# Patient Record
Sex: FEMALE | Race: WHITE | NOT HISPANIC OR LATINO | Employment: FULL TIME | URBAN - METROPOLITAN AREA
[De-identification: names, ages, dates, MRNs, and addresses within clinical notes are randomized per-mention and may not be internally consistent; named-entity substitution may affect disease eponyms.]

---

## 2018-02-09 ENCOUNTER — CONVERSION ENCOUNTER (OUTPATIENT)
Dept: GENERAL RADIOLOGY | Facility: HOSPITAL | Age: 38
End: 2018-02-09

## 2019-02-21 ENCOUNTER — HOSPITAL ENCOUNTER (OUTPATIENT)
Dept: OTHER | Facility: HOSPITAL | Age: 39
Discharge: HOME | End: 2019-02-21

## 2019-02-21 LAB
ALBUMIN SERPL-MCNC: 4.4 G/DL (ref 3.5–5)
ALBUMIN/GLOB SERPL: 1.6 {RATIO} (ref 1.4–2.6)
ALP SERPL-CCNC: 50 U/L (ref 42–98)
ALT SERPL-CCNC: 13 U/L (ref 10–40)
ANION GAP SERPL CALC-SCNC: 19 MMOL/L (ref 8–19)
AST SERPL-CCNC: 19 U/L (ref 15–50)
BASOPHILS # BLD AUTO: 0.06 10*3/UL (ref 0–0.2)
BASOPHILS NFR BLD AUTO: 0.7 % (ref 0–3)
BILIRUB SERPL-MCNC: 0.38 MG/DL (ref 0.2–1.3)
BUN SERPL-MCNC: 11 MG/DL (ref 5–25)
BUN/CREAT SERPL: 15 {RATIO} (ref 6–20)
CALCIUM SERPL-MCNC: 9.4 MG/DL (ref 8.7–10.4)
CHLORIDE SERPL-SCNC: 102 MMOL/L (ref 99–111)
CHOLEST SERPL-MCNC: 200 MG/DL (ref 107–200)
CHOLEST/HDLC SERPL: 2.2 {RATIO} (ref 3–6)
CONV ABS IMM GRAN: 0.02 10*3/UL (ref 0–0.2)
CONV CO2: 25 MMOL/L (ref 22–32)
CONV IMMATURE GRAN: 0.2 % (ref 0–1.8)
CONV TOTAL PROTEIN: 7.2 G/DL (ref 6.3–8.2)
CREAT UR-MCNC: 0.75 MG/DL (ref 0.5–0.9)
DEPRECATED RDW RBC AUTO: 41.4 FL (ref 36.4–46.3)
EOSINOPHIL # BLD AUTO: 0.11 10*3/UL (ref 0–0.7)
EOSINOPHIL # BLD AUTO: 1.2 % (ref 0–7)
ERYTHROCYTE [DISTWIDTH] IN BLOOD BY AUTOMATED COUNT: 12.6 % (ref 11.7–14.4)
GFR SERPLBLD BASED ON 1.73 SQ M-ARVRAT: >60 ML/MIN/{1.73_M2}
GLOBULIN UR ELPH-MCNC: 2.8 G/DL (ref 2–3.5)
GLUCOSE SERPL-MCNC: 93 MG/DL (ref 65–99)
HBA1C MFR BLD: 14.5 G/DL (ref 12–16)
HCT VFR BLD AUTO: 44.2 % (ref 37–47)
HDLC SERPL-MCNC: 91 MG/DL (ref 40–60)
LDLC SERPL CALC-MCNC: 97 MG/DL (ref 70–100)
LYMPHOCYTES # BLD AUTO: 1.82 10*3/UL (ref 1–5)
MCH RBC QN AUTO: 29.4 PG (ref 27–31)
MCHC RBC AUTO-ENTMCNC: 32.8 G/DL (ref 33–37)
MCV RBC AUTO: 89.7 FL (ref 81–99)
MONOCYTES # BLD AUTO: 0.57 10*3/UL (ref 0.2–1.2)
MONOCYTES NFR BLD AUTO: 6.4 % (ref 3–10)
NEUTROPHILS # BLD AUTO: 6.37 10*3/UL (ref 2–8)
NEUTROPHILS NFR BLD AUTO: 71.2 % (ref 30–85)
NRBC CBCN: 0 % (ref 0–0.7)
OSMOLALITY SERPL CALC.SUM OF ELEC: 293 MOSM/KG (ref 273–304)
PLATELET # BLD AUTO: 245 10*3/UL (ref 130–400)
PMV BLD AUTO: 12.2 FL (ref 9.4–12.3)
POTASSIUM SERPL-SCNC: 4.1 MMOL/L (ref 3.5–5.3)
RBC # BLD AUTO: 4.93 10*6/UL (ref 4.2–5.4)
SODIUM SERPL-SCNC: 142 MMOL/L (ref 135–147)
TRIGL SERPL-MCNC: 58 MG/DL (ref 40–150)
TSH SERPL-ACNC: 1.81 M[IU]/L (ref 0.27–4.2)
VARIANT LYMPHS NFR BLD MANUAL: 20.3 % (ref 20–45)
VLDLC SERPL-MCNC: 12 MG/DL (ref 5–37)
WBC # BLD AUTO: 8.95 10*3/UL (ref 4.8–10.8)

## 2019-03-18 ENCOUNTER — HOSPITAL ENCOUNTER (OUTPATIENT)
Dept: OTHER | Facility: HOSPITAL | Age: 39
Discharge: HOME | End: 2019-03-18
Attending: OBSTETRICS & GYNECOLOGY

## 2020-02-07 ENCOUNTER — HOSPITAL ENCOUNTER (OUTPATIENT)
Dept: OTHER | Facility: HOSPITAL | Age: 40
Discharge: HOME | End: 2020-02-07

## 2020-02-07 LAB
ALBUMIN SERPL-MCNC: 4.4 G/DL (ref 3.5–5)
ALBUMIN/GLOB SERPL: 1.8 {RATIO} (ref 1.4–2.6)
ALP SERPL-CCNC: 53 U/L (ref 42–98)
ALT SERPL-CCNC: 9 U/L (ref 10–40)
ANION GAP SERPL CALC-SCNC: 18 MMOL/L (ref 8–19)
AST SERPL-CCNC: 16 U/L (ref 15–50)
BASOPHILS # BLD AUTO: 0.06 10*3/UL (ref 0–0.2)
BASOPHILS NFR BLD AUTO: 1.1 % (ref 0–3)
BILIRUB SERPL-MCNC: 0.31 MG/DL (ref 0.2–1.3)
BUN SERPL-MCNC: 10 MG/DL (ref 5–25)
BUN/CREAT SERPL: 13 {RATIO} (ref 6–20)
CALCIUM SERPL-MCNC: 9.4 MG/DL (ref 8.7–10.4)
CHLORIDE SERPL-SCNC: 101 MMOL/L (ref 99–111)
CHOLEST SERPL-MCNC: 163 MG/DL (ref 107–200)
CHOLEST/HDLC SERPL: 2.5 {RATIO} (ref 3–6)
CONV ABS IMM GRAN: 0.01 10*3/UL (ref 0–0.2)
CONV CO2: 25 MMOL/L (ref 22–32)
CONV IMMATURE GRAN: 0.2 % (ref 0–1.8)
CONV TOTAL PROTEIN: 6.9 G/DL (ref 6.3–8.2)
CREAT UR-MCNC: 0.75 MG/DL (ref 0.5–0.9)
DEPRECATED RDW RBC AUTO: 39.8 FL (ref 36.4–46.3)
EOSINOPHIL # BLD AUTO: 0.13 10*3/UL (ref 0–0.7)
EOSINOPHIL # BLD AUTO: 2.4 % (ref 0–7)
ERYTHROCYTE [DISTWIDTH] IN BLOOD BY AUTOMATED COUNT: 12.3 % (ref 11.7–14.4)
GFR SERPLBLD BASED ON 1.73 SQ M-ARVRAT: >60 ML/MIN/{1.73_M2}
GLOBULIN UR ELPH-MCNC: 2.5 G/DL (ref 2–3.5)
GLUCOSE SERPL-MCNC: 100 MG/DL (ref 65–99)
HCT VFR BLD AUTO: 42.7 % (ref 37–47)
HDLC SERPL-MCNC: 66 MG/DL (ref 40–60)
HGB BLD-MCNC: 14.2 G/DL (ref 12–16)
LDLC SERPL CALC-MCNC: 86 MG/DL (ref 70–100)
LYMPHOCYTES # BLD AUTO: 1.57 10*3/UL (ref 1–5)
LYMPHOCYTES NFR BLD AUTO: 28.4 % (ref 20–45)
MCH RBC QN AUTO: 29.2 PG (ref 27–31)
MCHC RBC AUTO-ENTMCNC: 33.3 G/DL (ref 33–37)
MCV RBC AUTO: 87.9 FL (ref 81–99)
MONOCYTES # BLD AUTO: 0.44 10*3/UL (ref 0.2–1.2)
MONOCYTES NFR BLD AUTO: 8 % (ref 3–10)
NEUTROPHILS # BLD AUTO: 3.32 10*3/UL (ref 2–8)
NEUTROPHILS NFR BLD AUTO: 59.9 % (ref 30–85)
NRBC CBCN: 0 % (ref 0–0.7)
OSMOLALITY SERPL CALC.SUM OF ELEC: 289 MOSM/KG (ref 273–304)
PLATELET # BLD AUTO: 268 10*3/UL (ref 130–400)
PMV BLD AUTO: 12.3 FL (ref 9.4–12.3)
POTASSIUM SERPL-SCNC: 3.9 MMOL/L (ref 3.5–5.3)
RBC # BLD AUTO: 4.86 10*6/UL (ref 4.2–5.4)
SODIUM SERPL-SCNC: 140 MMOL/L (ref 135–147)
TRIGL SERPL-MCNC: 53 MG/DL (ref 40–150)
TSH SERPL-ACNC: 1.3 M[IU]/L (ref 0.27–4.2)
VLDLC SERPL-MCNC: 11 MG/DL (ref 5–37)
WBC # BLD AUTO: 5.53 10*3/UL (ref 4.8–10.8)

## 2020-02-10 LAB
EST. AVERAGE GLUCOSE BLD GHB EST-MCNC: 100 MG/DL
HBA1C MFR BLD: 5.1 % (ref 3.5–5.7)

## 2020-06-16 ENCOUNTER — HOSPITAL ENCOUNTER (OUTPATIENT)
Dept: OTHER | Facility: HOSPITAL | Age: 40
Discharge: HOME | End: 2020-06-16
Attending: OBSTETRICS & GYNECOLOGY

## 2020-12-28 ENCOUNTER — HOSPITAL ENCOUNTER (OUTPATIENT)
Dept: OTHER | Facility: HOSPITAL | Age: 40
Discharge: HOME | End: 2020-12-28
Attending: INTERNAL MEDICINE

## 2021-01-21 ENCOUNTER — HOSPITAL ENCOUNTER (OUTPATIENT)
Dept: OTHER | Facility: HOSPITAL | Age: 41
Discharge: HOME | End: 2021-01-21
Attending: INTERNAL MEDICINE

## 2021-03-15 ENCOUNTER — HOSPITAL ENCOUNTER (OUTPATIENT)
Dept: OTHER | Facility: HOSPITAL | Age: 41
Discharge: HOME | End: 2021-03-15

## 2021-03-15 LAB
ALBUMIN SERPL-MCNC: 4.3 G/DL (ref 3.5–5)
ALBUMIN/GLOB SERPL: 1.8 {RATIO} (ref 1.4–2.6)
ALP SERPL-CCNC: 45 U/L (ref 42–98)
ALT SERPL-CCNC: 10 U/L (ref 10–40)
ANION GAP SERPL CALC-SCNC: 16 MMOL/L (ref 8–19)
AST SERPL-CCNC: 18 U/L (ref 15–50)
BASOPHILS # BLD AUTO: 0.06 10*3/UL (ref 0–0.2)
BASOPHILS NFR BLD AUTO: 1.1 % (ref 0–3)
BILIRUB SERPL-MCNC: 0.32 MG/DL (ref 0.2–1.3)
BUN SERPL-MCNC: 10 MG/DL (ref 5–25)
BUN/CREAT SERPL: 13 {RATIO} (ref 6–20)
CALCIUM SERPL-MCNC: 8.8 MG/DL (ref 8.7–10.4)
CHLORIDE SERPL-SCNC: 101 MMOL/L (ref 99–111)
CHOLEST SERPL-MCNC: 158 MG/DL (ref 107–200)
CHOLEST/HDLC SERPL: 2.1 {RATIO} (ref 3–6)
CONV ABS IMM GRAN: 0.01 10*3/UL (ref 0–0.2)
CONV CO2: 25 MMOL/L (ref 22–32)
CONV IMMATURE GRAN: 0.2 % (ref 0–1.8)
CONV TOTAL PROTEIN: 6.7 G/DL (ref 6.3–8.2)
CREAT UR-MCNC: 0.75 MG/DL (ref 0.5–0.9)
DEPRECATED RDW RBC AUTO: 41.5 FL (ref 36.4–46.3)
EOSINOPHIL # BLD AUTO: 0.14 10*3/UL (ref 0–0.7)
EOSINOPHIL # BLD AUTO: 2.6 % (ref 0–7)
ERYTHROCYTE [DISTWIDTH] IN BLOOD BY AUTOMATED COUNT: 13.4 % (ref 11.7–14.4)
GFR SERPLBLD BASED ON 1.73 SQ M-ARVRAT: >60 ML/MIN/{1.73_M2}
GLOBULIN UR ELPH-MCNC: 2.4 G/DL (ref 2–3.5)
GLUCOSE SERPL-MCNC: 80 MG/DL (ref 65–99)
HCT VFR BLD AUTO: 40.9 % (ref 37–47)
HDLC SERPL-MCNC: 74 MG/DL (ref 40–60)
HGB BLD-MCNC: 13.1 G/DL (ref 12–16)
LDLC SERPL CALC-MCNC: 71 MG/DL (ref 70–100)
LYMPHOCYTES # BLD AUTO: 1.85 10*3/UL (ref 1–5)
LYMPHOCYTES NFR BLD AUTO: 34.8 % (ref 20–45)
MCH RBC QN AUTO: 27.1 PG (ref 27–31)
MCHC RBC AUTO-ENTMCNC: 32 G/DL (ref 33–37)
MCV RBC AUTO: 84.7 FL (ref 81–99)
MONOCYTES # BLD AUTO: 0.44 10*3/UL (ref 0.2–1.2)
MONOCYTES NFR BLD AUTO: 8.3 % (ref 3–10)
NEUTROPHILS # BLD AUTO: 2.81 10*3/UL (ref 2–8)
NEUTROPHILS NFR BLD AUTO: 53 % (ref 30–85)
NRBC CBCN: 0 % (ref 0–0.7)
OSMOLALITY SERPL CALC.SUM OF ELEC: 284 MOSM/KG (ref 273–304)
PLATELET # BLD AUTO: 276 10*3/UL (ref 130–400)
PMV BLD AUTO: 11.9 FL (ref 9.4–12.3)
POTASSIUM SERPL-SCNC: 4 MMOL/L (ref 3.5–5.3)
RBC # BLD AUTO: 4.83 10*6/UL (ref 4.2–5.4)
SODIUM SERPL-SCNC: 138 MMOL/L (ref 135–147)
TRIGL SERPL-MCNC: 67 MG/DL (ref 40–150)
TSH SERPL-ACNC: 1.21 M[IU]/L (ref 0.27–4.2)
VLDLC SERPL-MCNC: 13 MG/DL (ref 5–37)
WBC # BLD AUTO: 5.31 10*3/UL (ref 4.8–10.8)

## 2021-08-13 ENCOUNTER — TRANSCRIBE ORDERS (OUTPATIENT)
Dept: ADMINISTRATIVE | Facility: HOSPITAL | Age: 41
End: 2021-08-13

## 2021-08-13 DIAGNOSIS — Z92.89 HISTORY OF MAMMOGRAPHY, SCREENING: Primary | ICD-10-CM

## 2021-09-09 ENCOUNTER — HOSPITAL ENCOUNTER (OUTPATIENT)
Dept: MAMMOGRAPHY | Facility: HOSPITAL | Age: 41
Discharge: HOME | End: 2021-09-09
Admitting: OBSTETRICS & GYNECOLOGY

## 2021-09-09 DIAGNOSIS — Z92.89 HISTORY OF MAMMOGRAPHY, SCREENING: ICD-10-CM

## 2021-09-09 PROCEDURE — 77063 BREAST TOMOSYNTHESIS BI: CPT

## 2021-09-09 PROCEDURE — 77067 SCR MAMMO BI INCL CAD: CPT

## 2021-09-10 ENCOUNTER — APPOINTMENT (OUTPATIENT)
Dept: MAMMOGRAPHY | Facility: HOSPITAL | Age: 41
End: 2021-09-10

## 2022-08-12 ENCOUNTER — OFFICE VISIT (OUTPATIENT)
Dept: OBSTETRICS AND GYNECOLOGY | Facility: CLINIC | Age: 42
End: 2022-08-12

## 2022-08-12 VITALS
BODY MASS INDEX: 24.36 KG/M2 | DIASTOLIC BLOOD PRESSURE: 90 MMHG | SYSTOLIC BLOOD PRESSURE: 159 MMHG | HEART RATE: 71 BPM | HEIGHT: 65 IN | WEIGHT: 146.2 LBS

## 2022-08-12 DIAGNOSIS — Z12.31 ENCOUNTER FOR SCREENING MAMMOGRAM FOR MALIGNANT NEOPLASM OF BREAST: ICD-10-CM

## 2022-08-12 DIAGNOSIS — Z01.419 WELL WOMAN EXAM: Primary | ICD-10-CM

## 2022-08-12 DIAGNOSIS — N84.1 CERVICAL POLYP: ICD-10-CM

## 2022-08-12 DIAGNOSIS — R03.0 ELEVATED BLOOD PRESSURE READING: ICD-10-CM

## 2022-08-12 DIAGNOSIS — Z12.11 SCREENING FOR COLON CANCER: ICD-10-CM

## 2022-08-12 PROCEDURE — 57500 BIOPSY OF CERVIX: CPT | Performed by: NURSE PRACTITIONER

## 2022-08-12 PROCEDURE — 88305 TISSUE EXAM BY PATHOLOGIST: CPT | Performed by: NURSE PRACTITIONER

## 2022-08-12 PROCEDURE — 99396 PREV VISIT EST AGE 40-64: CPT | Performed by: NURSE PRACTITIONER

## 2022-08-12 NOTE — PROGRESS NOTES
"  HPI:   42 y.o..Presents for well woman exam. Contraception or HRT: Contraception:  Condoms  Menses:   q 21 days, lasts 5 days, flooding 12 hour pad every 1 hour for the 1st 2-3 days of menses.   Pain:  None  Last pap normal   Complaints: heavy menstrual bleeding, pms, hx uterine fibroids  Thyroid screenings have been normal  Does not desire hormone management, does not desire surgical management for menorrhagia    Past Medical History:   Diagnosis Date   • Anxiety       Past Surgical History:   Procedure Laterality Date   • BILATERAL INSERTION OF EAR TUBES AND ADENOIDECTOMY     • FACIAL RECONSTRUCTION SURGERY Right    • RHINOPLASTY        Family History   Problem Relation Age of Onset   • Prostate cancer Paternal Grandfather    • Lung cancer Paternal Uncle      Allergies as of 2022 - Reviewed 2022   Allergen Reaction Noted   • Sulfacetamide sodium Anaphylaxis 2021   • Sulfa antibiotics Unknown - High Severity 2021        PCP: does manage PMHx and preventative labs    /90   Pulse 71   Ht 165.1 cm (65\")   Wt 66.3 kg (146 lb 3.2 oz)   LMP 2022 (Exact Date)   Breastfeeding No   BMI 24.33 kg/m²     PHYSICAL EXAM: Chaperone present   General- NAD, alert and oriented, appropriate  Psych- Normal mood, good memory  Neck- No masses, no thyroid enlargement  Lymphatic- No palpable neck, axillary, or groin nodes  CV- Regular rhythm, no murmurs  Resp- CTA to bases, no wheezes  Abdomen- Soft, non distended, non tender, no masses  Breast left-  Bilaterally symmetrical, no masses, non tender, no nipple discharge  Breast right- Bilaterally symmetrical, no masses, non tender, no nipple discharge  External genitalia- Normal female, no lesions  Urethra/meatus- Normal, no masses, non tender, no prolapse  Bladder- Normal, no masses, non tender, no prolapse  Vagina- Normal, no atrophy, no lesions, no discharge, no prolapse  Cvx- Small flesh colored endocervical polyp extending to os, no " discharge, No cervical motion tenderness, after obtaining consent cervix cleaned with betadine, using josefina forceps removed polyp without difficulty, small amount of bleeding noted, applied Monsel's, bleeding resolved, sent for pathology  Uterus- Normal size, shape & consistency.  Non tender, mobile, & no prolapse  Adnexa- No mass, non tender  Anus/Rectum/Perineum- Not performed  Ext- No edema, no cyanosis    Skin- No lesions, no rashes, no acanthosis nigricans    ASSESSMENT and PLAN:    Diagnoses and all orders for this visit:    1. Well woman exam (Primary)    2. Screening for colon cancer  -     Ambulatory Referral to Gastroenterology    3. Encounter for screening mammogram for malignant neoplasm of breast  -     Mammo Screening Digital Tomosynthesis Bilateral With CAD; Future    4. Cervical polyp  -     Tissue Pathology Exam    5. Elevated blood pressure reading      Preventative:   BREAST HEALTH- Monthly self breast exam importance and how to reviewed. MMG and/or MRI (prn) reviewed per society guidelines and her individual history. Screen: Updated today  CERVICAL CANCER Screening- Reviewed current ASCCP guidelines for screening w and wo cotest HPV, age specific.  Screen: Already up to date  COLON CANCER Screening- Reviewed current medical society guidelines and options.  Screen:  Updated today  VACCINATIONS Recommended: Vaccination are up to date.  Importance discussed, risk being unvaccinated reviewed.  Questions answered  Smoking status- NON SMOKER  Follow up PCP/Specialist PMHx and Labs  Myriad: Does not qualify.  All treatment options with regard to pt hx NLT hormonal, surgical, expectant R/B/A/SE/E   FIBROIDS- Dx, incidence, symptoms, treatment options wrt pt hx NLT: expectant, hormonal (BC, IUD, Lupron, others), myomectomy, UT aa embolization, hysterectomy.  Declines workup for menorrhagia or medical treatment, desires expectant management  Counseling provided for after care polyp removal, follow up  immediately with any signs of infection, heavy bleeding or pain, avoid intercourse 1 week  Reports drinking energy drink prior to appt, bp is elevated, recommend avoid energy drinks and fu with pcp for evaluation of BP  She understands the importance of having any ordered tests to be performed in a timely fashion.  The risks of not performing them include, but are not limited to, advanced cancer stages, bone loss from osteoporosis and/or subsequent increase in morbidity and/or mortality.  She is encouraged to review her results online and/or contact or office if she has questions.     Follow Up:  Return in about 1 year (around 8/12/2023), or if symptoms worsen or fail to improve.        Екатерина Barth, APRN  08/12/2022

## 2022-08-15 LAB
CYTO UR: NORMAL
LAB AP CASE REPORT: NORMAL
LAB AP CLINICAL INFORMATION: NORMAL
PATH REPORT.FINAL DX SPEC: NORMAL
PATH REPORT.GROSS SPEC: NORMAL

## 2022-08-26 ENCOUNTER — HOSPITAL ENCOUNTER (EMERGENCY)
Facility: HOSPITAL | Age: 42
Discharge: HOME | End: 2022-08-26
Attending: EMERGENCY MEDICINE | Admitting: EMERGENCY MEDICINE

## 2022-08-26 VITALS
DIASTOLIC BLOOD PRESSURE: 83 MMHG | SYSTOLIC BLOOD PRESSURE: 121 MMHG | WEIGHT: 146.61 LBS | OXYGEN SATURATION: 100 % | BODY MASS INDEX: 24.43 KG/M2 | TEMPERATURE: 98.3 F | HEART RATE: 85 BPM | HEIGHT: 65 IN | RESPIRATION RATE: 18 BRPM

## 2022-08-26 DIAGNOSIS — Z01.89 ROUTINE LAB DRAW: Primary | ICD-10-CM

## 2022-08-26 LAB
ALBUMIN SERPL-MCNC: 4.9 G/DL (ref 3.5–5.2)
ALBUMIN/GLOB SERPL: 2 G/DL
ALP SERPL-CCNC: 50 U/L (ref 39–117)
ALT SERPL W P-5'-P-CCNC: 10 U/L (ref 1–33)
ANION GAP SERPL CALCULATED.3IONS-SCNC: 10.7 MMOL/L (ref 5–15)
AST SERPL-CCNC: 15 U/L (ref 1–32)
BASOPHILS # BLD AUTO: 0.06 10*3/MM3 (ref 0–0.2)
BASOPHILS NFR BLD AUTO: 0.8 % (ref 0–1.5)
BILIRUB SERPL-MCNC: 0.3 MG/DL (ref 0–1.2)
BUN SERPL-MCNC: 11 MG/DL (ref 6–20)
BUN/CREAT SERPL: 14.9 (ref 7–25)
CALCIUM SPEC-SCNC: 9.4 MG/DL (ref 8.6–10.5)
CHLORIDE SERPL-SCNC: 99 MMOL/L (ref 98–107)
CO2 SERPL-SCNC: 28.3 MMOL/L (ref 22–29)
CREAT SERPL-MCNC: 0.74 MG/DL (ref 0.57–1)
DEPRECATED RDW RBC AUTO: 42.1 FL (ref 37–54)
EGFRCR SERPLBLD CKD-EPI 2021: 102.5 ML/MIN/1.73
EOSINOPHIL # BLD AUTO: 0.03 10*3/MM3 (ref 0–0.4)
EOSINOPHIL NFR BLD AUTO: 0.4 % (ref 0.3–6.2)
ERYTHROCYTE [DISTWIDTH] IN BLOOD BY AUTOMATED COUNT: 13.8 % (ref 12.3–15.4)
GLOBULIN UR ELPH-MCNC: 2.5 GM/DL
GLUCOSE SERPL-MCNC: 116 MG/DL (ref 65–99)
HCT VFR BLD AUTO: 41.1 % (ref 34–46.6)
HGB BLD-MCNC: 13.6 G/DL (ref 12–15.9)
HOLD SPECIMEN: NORMAL
HOLD SPECIMEN: NORMAL
IMM GRANULOCYTES # BLD AUTO: 0.01 10*3/MM3 (ref 0–0.05)
IMM GRANULOCYTES NFR BLD AUTO: 0.1 % (ref 0–0.5)
LYMPHOCYTES # BLD AUTO: 1.16 10*3/MM3 (ref 0.7–3.1)
LYMPHOCYTES NFR BLD AUTO: 15.2 % (ref 19.6–45.3)
MCH RBC QN AUTO: 27.6 PG (ref 26.6–33)
MCHC RBC AUTO-ENTMCNC: 33.1 G/DL (ref 31.5–35.7)
MCV RBC AUTO: 83.4 FL (ref 79–97)
MONOCYTES # BLD AUTO: 0.38 10*3/MM3 (ref 0.1–0.9)
MONOCYTES NFR BLD AUTO: 5 % (ref 5–12)
NEUTROPHILS NFR BLD AUTO: 5.99 10*3/MM3 (ref 1.7–7)
NEUTROPHILS NFR BLD AUTO: 78.5 % (ref 42.7–76)
NRBC BLD AUTO-RTO: 0 /100 WBC (ref 0–0.2)
PLATELET # BLD AUTO: 258 10*3/MM3 (ref 140–450)
PMV BLD AUTO: 12.1 FL (ref 6–12)
POTASSIUM SERPL-SCNC: 3.6 MMOL/L (ref 3.5–5.2)
PROT SERPL-MCNC: 7.4 G/DL (ref 6–8.5)
RBC # BLD AUTO: 4.93 10*6/MM3 (ref 3.77–5.28)
SODIUM SERPL-SCNC: 138 MMOL/L (ref 136–145)
WBC NRBC COR # BLD: 7.63 10*3/MM3 (ref 3.4–10.8)
WHOLE BLOOD HOLD COAG: NORMAL
WHOLE BLOOD HOLD SPECIMEN: NORMAL

## 2022-08-26 PROCEDURE — 85025 COMPLETE CBC W/AUTO DIFF WBC: CPT | Performed by: EMERGENCY MEDICINE

## 2022-08-26 PROCEDURE — 36415 COLL VENOUS BLD VENIPUNCTURE: CPT

## 2022-08-26 PROCEDURE — 99283 EMERGENCY DEPT VISIT LOW MDM: CPT

## 2022-08-26 PROCEDURE — 80053 COMPREHEN METABOLIC PANEL: CPT | Performed by: EMERGENCY MEDICINE

## 2022-08-26 RX ORDER — SODIUM CHLORIDE 0.9 % (FLUSH) 0.9 %
10 SYRINGE (ML) INJECTION AS NEEDED
Status: DISCONTINUED | OUTPATIENT
Start: 2022-08-26 | End: 2022-08-26 | Stop reason: HOSPADM

## 2022-09-01 ENCOUNTER — LAB (OUTPATIENT)
Dept: LAB | Facility: HOSPITAL | Age: 42
End: 2022-09-01

## 2022-09-01 ENCOUNTER — OFFICE VISIT (OUTPATIENT)
Dept: FAMILY MEDICINE CLINIC | Age: 42
End: 2022-09-01

## 2022-09-01 VITALS
WEIGHT: 147.2 LBS | HEIGHT: 65 IN | SYSTOLIC BLOOD PRESSURE: 145 MMHG | BODY MASS INDEX: 24.53 KG/M2 | HEART RATE: 74 BPM | DIASTOLIC BLOOD PRESSURE: 85 MMHG

## 2022-09-01 DIAGNOSIS — R53.83 FATIGUE, UNSPECIFIED TYPE: Primary | ICD-10-CM

## 2022-09-01 DIAGNOSIS — J30.9 ALLERGIC RHINITIS, UNSPECIFIED SEASONALITY, UNSPECIFIED TRIGGER: ICD-10-CM

## 2022-09-01 DIAGNOSIS — Z13.31 NEGATIVE DEPRESSION SCREENING: ICD-10-CM

## 2022-09-01 DIAGNOSIS — R53.83 FATIGUE, UNSPECIFIED TYPE: ICD-10-CM

## 2022-09-01 DIAGNOSIS — R03.0 ELEVATED BLOOD PRESSURE READING: ICD-10-CM

## 2022-09-01 DIAGNOSIS — Z00.00 ENCOUNTER FOR MEDICAL EXAMINATION TO ESTABLISH CARE: ICD-10-CM

## 2022-09-01 LAB
BASOPHILS # BLD AUTO: 0.06 10*3/MM3 (ref 0–0.2)
BASOPHILS NFR BLD AUTO: 1 % (ref 0–1.5)
DEPRECATED RDW RBC AUTO: 44.8 FL (ref 37–54)
EOSINOPHIL # BLD AUTO: 0.1 10*3/MM3 (ref 0–0.4)
EOSINOPHIL NFR BLD AUTO: 1.6 % (ref 0.3–6.2)
ERYTHROCYTE [DISTWIDTH] IN BLOOD BY AUTOMATED COUNT: 14.1 % (ref 12.3–15.4)
HCT VFR BLD AUTO: 41.5 % (ref 34–46.6)
HGB BLD-MCNC: 13 G/DL (ref 12–15.9)
IMM GRANULOCYTES # BLD AUTO: 0 10*3/MM3 (ref 0–0.05)
IMM GRANULOCYTES NFR BLD AUTO: 0 % (ref 0–0.5)
LYMPHOCYTES # BLD AUTO: 1.59 10*3/MM3 (ref 0.7–3.1)
LYMPHOCYTES NFR BLD AUTO: 25.8 % (ref 19.6–45.3)
MCH RBC QN AUTO: 26.9 PG (ref 26.6–33)
MCHC RBC AUTO-ENTMCNC: 31.3 G/DL (ref 31.5–35.7)
MCV RBC AUTO: 85.9 FL (ref 79–97)
MONOCYTES # BLD AUTO: 0.43 10*3/MM3 (ref 0.1–0.9)
MONOCYTES NFR BLD AUTO: 7 % (ref 5–12)
NEUTROPHILS NFR BLD AUTO: 3.98 10*3/MM3 (ref 1.7–7)
NEUTROPHILS NFR BLD AUTO: 64.6 % (ref 42.7–76)
PLATELET # BLD AUTO: 208 10*3/MM3 (ref 140–450)
PMV BLD AUTO: 11.7 FL (ref 6–12)
RBC # BLD AUTO: 4.83 10*6/MM3 (ref 3.77–5.28)
WBC NRBC COR # BLD: 6.16 10*3/MM3 (ref 3.4–10.8)

## 2022-09-01 PROCEDURE — 36415 COLL VENOUS BLD VENIPUNCTURE: CPT

## 2022-09-01 PROCEDURE — 85025 COMPLETE CBC W/AUTO DIFF WBC: CPT

## 2022-09-01 PROCEDURE — 99213 OFFICE O/P EST LOW 20 MIN: CPT | Performed by: NURSE PRACTITIONER

## 2022-09-01 NOTE — PROGRESS NOTES
"Montserrat De La Rosa presents to [facility] with complaint of  Establish Care (Pt requesting lab work./)    SUBJECTIVE  History of Present Illness     Very pleasant patient is here to establish relations as a new patient. She is  with no children. 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 She was formerly seeing Dr. Essie Hollins. She had screening labs there that showed WBC of 22, Hct 45, and Plt of 1656. She was seen at [facility] for this and had repeat labs that were completely normal. Patient would like to have CBC checked again today. She takes xyzal for allergies and this works well for her.     The following portions of the patient's history were reviewed and updated as appropriate: allergies, current medications, past family history, past medical history, past social history, past surgical history and problem list. PAP smear was normal in 2021, she did have cervical polyp that was benign.     OBJECTIVE  Vital Signs:   /85 (BP Location: Left arm, Patient Position: Sitting)   Pulse 74   Ht 165.1 cm (65\")   Wt 66.8 kg (147 lb 3.2 oz)   BMI 24.50 kg/m²       Physical Exam  Vitals reviewed.   Constitutional:       General: She is not in acute distress.     Appearance: Normal appearance. She is not ill-appearing.   HENT:      Head: Normocephalic and atraumatic.      Right Ear: Tympanic membrane and ear canal normal.      Left Ear: Tympanic membrane and ear canal normal.      Nose: Nose normal.      Mouth/Throat:      Mouth: Mucous membranes are moist.      Pharynx: Oropharynx is clear.   Neck:      Thyroid: No thyromegaly or thyroid tenderness.      Trachea: Trachea normal.   Cardiovascular:      Rate and Rhythm: Normal rate and regular rhythm.      Pulses: Normal pulses.      Heart sounds: Normal heart sounds.   Pulmonary:      Effort: Pulmonary effort is normal.      Breath sounds: Normal breath sounds.   Musculoskeletal:      Cervical " back: Neck supple.   Skin:     General: Skin is warm and dry.      Capillary Refill: Capillary refill takes less than 2 seconds.   Neurological:      General: No focal deficit present.      Mental Status: She is alert and oriented to person, place, and time. Mental status is at baseline.   Psychiatric:         Mood and Affect: Mood normal.         Behavior: Behavior normal.         Judgment: Judgment normal.          Results Review:  The following data was reviewed by Keri De La Rosa, QING [unfilled] 11:25 EDT.  LABORATORY - SCAN - LAB RESULTS_8/22/22_[facility] (08/22/2022)  Comprehensive Metabolic Panel (08/26/2022 14:18)  CBC & Differential (08/26/2022 14:18)      ASSESSMENT AND PLAN:  Diagnoses and all orders for this visit:    1. Fatigue, unspecified type (Primary)  -     CBC & Differential; Future    2. Negative depression screening    3. Encounter for medical examination to establish care    4. Allergic rhinitis, unspecified seasonality, unspecified trigger  Comments:  well controlled with xyzal     5. Elevated blood pressure reading  Assessment & Plan:  -The patient was instructed to take BP at home or in our allergy room here. If SBP greater than 135 on a consistent basis, the patient needs to make appointment to be seen.            Follow Up   Return in about 1 year (around 9/1/2023). Patient to notify office with any acute concerns or issues.  Patient verbalizes understanding, agrees with plan of care and has no further questions upon discharge.     Patient was given instructions and counseling regarding her condition or for health maintenance advice. Please see specific information pulled into the AVS if appropriate.     Discussed the importance of following up with any needed screening tests/labs/specialist appointments and any requested follow-up recommended by me today. Importance of maintaining follow-up discussed and patient accepts that missed appointments can delay diagnosis and potentially lead  to worsening of conditions.

## 2022-09-01 NOTE — ASSESSMENT & PLAN NOTE
-The patient was instructed to take BP at home or in our allergy room here. If SBP greater than 135 on a consistent basis, the patient needs to make appointment to be seen.

## 2022-09-16 ENCOUNTER — HOSPITAL ENCOUNTER (OUTPATIENT)
Dept: MAMMOGRAPHY | Facility: HOSPITAL | Age: 42
Discharge: HOME | End: 2022-09-16
Admitting: NURSE PRACTITIONER

## 2022-09-16 DIAGNOSIS — Z12.31 ENCOUNTER FOR SCREENING MAMMOGRAM FOR MALIGNANT NEOPLASM OF BREAST: ICD-10-CM

## 2022-09-16 PROCEDURE — 77067 SCR MAMMO BI INCL CAD: CPT

## 2022-09-16 PROCEDURE — 77063 BREAST TOMOSYNTHESIS BI: CPT

## 2022-09-22 RX ORDER — LEVOCETIRIZINE DIHYDROCHLORIDE 5 MG/1
5 TABLET, FILM COATED ORAL DAILY
Qty: 90 TABLET | Refills: 1 | Status: SHIPPED | OUTPATIENT
Start: 2022-09-22

## 2023-05-22 ENCOUNTER — PREP FOR SURGERY (OUTPATIENT)
Dept: OTHER | Facility: HOSPITAL | Age: 43
End: 2023-05-22
Payer: COMMERCIAL

## 2023-05-22 ENCOUNTER — CLINICAL SUPPORT (OUTPATIENT)
Dept: GASTROENTEROLOGY | Facility: CLINIC | Age: 43
End: 2023-05-22
Payer: COMMERCIAL

## 2023-05-22 DIAGNOSIS — Z12.11 COLON CANCER SCREENING: Primary | ICD-10-CM

## 2023-05-22 RX ORDER — SODIUM, POTASSIUM,MAG SULFATES 17.5-3.13G
1 SOLUTION, RECONSTITUTED, ORAL ORAL EVERY 12 HOURS
Qty: 354 ML | Refills: 0 | Status: SHIPPED | OUTPATIENT
Start: 2023-05-22

## 2023-05-22 NOTE — PROGRESS NOTES
Montserrat De La Rosa  1980  43 y.o.    Reason for call: Screening Colonoscopy  Prep prescribed: Suprep  Prep instructions reviewed with patient and sent to patient via [employer]  Clearance needed? No  If yes, what clearance is needed? N/A  Clearance has been requested from NA  The patient has been scheduled for: Colonoscopy  Family history of colon cancer? No  If yes, indicate relative: NA   Family History   Problem Relation Age of Onset   • Prostate cancer Paternal Grandfather    • Lung cancer Paternal Uncle      Past Medical History:   Diagnosis Date   • Anxiety      Allergies   Allergen Reactions   • Sulfacetamide Sodium Anaphylaxis   • Sulfa Antibiotics Unknown - High Severity     acute kidney failure     Past Surgical History:   Procedure Laterality Date   • BILATERAL INSERTION OF EAR TUBES AND ADENOIDECTOMY     • FACIAL RECONSTRUCTION SURGERY Right    • RHINOPLASTY       Social History     Socioeconomic History   • Marital status:    Tobacco Use   • Smoking status: Former   • Smokeless tobacco: Never   Vaping Use   • Vaping Use: Never used   Substance and Sexual Activity   • Alcohol use: Not Currently   • Drug use: Not Currently   • Sexual activity: Yes     Partners: Male     Birth control/protection: None       Current Outpatient Medications:   •  levocetirizine (XYZAL) 5 MG tablet, Take 1 tablet by mouth Daily., Disp: 90 tablet, Rfl: 1'  Answers for HPI/ROS submitted by the patient on 5/21/2023  What is the primary reason for your visit?: Physical

## 2023-08-14 ENCOUNTER — TELEPHONE (OUTPATIENT)
Dept: GASTROENTEROLOGY | Facility: CLINIC | Age: 43
End: 2023-08-14
Payer: COMMERCIAL

## 2023-08-21 NOTE — PRE-PROCEDURE INSTRUCTIONS
"Instructed on date and arrival time of  0800.Come to entrance \"C\". Must have  over age 18 to drive home.  May have two visitors; however, children under 12 must stay in waiting room.  Discussed clear liquid diet (no red or purple) and bowel prep.  May take medications as usual except for blood thinners, diabetic medications, and weight loss medications.  Bring list of medications.  Verbalized understanding of instructions given.  Phone number given for questions or concerns.  "

## 2023-08-27 ENCOUNTER — ANESTHESIA EVENT (OUTPATIENT)
Dept: GASTROENTEROLOGY | Facility: HOSPITAL | Age: 43
End: 2023-08-27
Payer: COMMERCIAL

## 2023-08-27 NOTE — ANESTHESIA PREPROCEDURE EVALUATION
Anesthesia Evaluation     Patient summary reviewed and Nursing notes reviewed   NPO Solid Status: > 8 hours  NPO Liquid Status: > 2 hours           Airway   Mallampati: II  No difficulty expected  Dental - normal exam     Pulmonary - normal exam   (+) a smoker Former,  Cardiovascular - negative cardio ROS and normal exam        Neuro/Psych  (+) psychiatric history Anxiety  GI/Hepatic/Renal/Endo - negative ROS     Musculoskeletal (-) negative ROS    Abdominal    Substance History - negative use     OB/GYN negative ob/gyn ROS         Other                      Anesthesia Plan    ASA 2     general   total IV anesthesia  intravenous induction     Anesthetic plan, risks, benefits, and alternatives have been provided, discussed and informed consent has been obtained with: patient and spouse/significant other.    Plan discussed with CRNA.    CODE STATUS:

## 2023-08-28 ENCOUNTER — HOSPITAL ENCOUNTER (OUTPATIENT)
Facility: HOSPITAL | Age: 43
Setting detail: HOSPITAL OUTPATIENT SURGERY
Discharge: HOME | End: 2023-08-28
Attending: INTERNAL MEDICINE | Admitting: INTERNAL MEDICINE
Payer: COMMERCIAL

## 2023-08-28 ENCOUNTER — TELEPHONE (OUTPATIENT)
Dept: GASTROENTEROLOGY | Facility: CLINIC | Age: 43
End: 2023-08-28
Payer: COMMERCIAL

## 2023-08-28 ENCOUNTER — ANESTHESIA (OUTPATIENT)
Dept: GASTROENTEROLOGY | Facility: HOSPITAL | Age: 43
End: 2023-08-28
Payer: COMMERCIAL

## 2023-08-28 VITALS
WEIGHT: 145.94 LBS | HEART RATE: 70 BPM | DIASTOLIC BLOOD PRESSURE: 92 MMHG | BODY MASS INDEX: 24.32 KG/M2 | RESPIRATION RATE: 14 BRPM | SYSTOLIC BLOOD PRESSURE: 115 MMHG | OXYGEN SATURATION: 100 % | TEMPERATURE: 97.6 F | HEIGHT: 65 IN

## 2023-08-28 LAB — B-HCG UR QL: NEGATIVE

## 2023-08-28 PROCEDURE — 81025 URINE PREGNANCY TEST: CPT | Performed by: INTERNAL MEDICINE

## 2023-08-28 PROCEDURE — 25010000002 PROPOFOL 10 MG/ML EMULSION: Performed by: NURSE ANESTHETIST, CERTIFIED REGISTERED

## 2023-08-28 RX ORDER — PROPOFOL 10 MG/ML
VIAL (ML) INTRAVENOUS AS NEEDED
Status: DISCONTINUED | OUTPATIENT
Start: 2023-08-28 | End: 2023-08-28 | Stop reason: SURG

## 2023-08-28 RX ORDER — SODIUM CHLORIDE, SODIUM LACTATE, POTASSIUM CHLORIDE, CALCIUM CHLORIDE 600; 310; 30; 20 MG/100ML; MG/100ML; MG/100ML; MG/100ML
30 INJECTION, SOLUTION INTRAVENOUS CONTINUOUS
Status: DISCONTINUED | OUTPATIENT
Start: 2023-08-28 | End: 2023-08-28 | Stop reason: HOSPADM

## 2023-08-28 RX ORDER — LIDOCAINE HYDROCHLORIDE 20 MG/ML
INJECTION, SOLUTION EPIDURAL; INFILTRATION; INTRACAUDAL; PERINEURAL AS NEEDED
Status: DISCONTINUED | OUTPATIENT
Start: 2023-08-28 | End: 2023-08-28 | Stop reason: SURG

## 2023-08-28 RX ADMIN — SODIUM CHLORIDE, POTASSIUM CHLORIDE, SODIUM LACTATE AND CALCIUM CHLORIDE 30 ML/HR: 600; 310; 30; 20 INJECTION, SOLUTION INTRAVENOUS at 09:01

## 2023-08-28 RX ADMIN — PROPOFOL 175 MCG/KG/MIN: 10 INJECTION, EMULSION INTRAVENOUS at 09:57

## 2023-08-28 RX ADMIN — LIDOCAINE HYDROCHLORIDE 50 MG: 20 INJECTION, SOLUTION EPIDURAL; INFILTRATION; INTRACAUDAL; PERINEURAL at 09:57

## 2023-08-28 RX ADMIN — PROPOFOL 100 MG: 10 INJECTION, EMULSION INTRAVENOUS at 09:57

## 2023-08-28 NOTE — H&P
Pre Procedure History & Physical    Chief Complaint:   Screening colonoscopy    Subjective     HPI:   44 yo F here for screening colonoscopy.    Past Medical History:   Past Medical History:   Diagnosis Date    Anxiety        Past Surgical History:  Past Surgical History:   Procedure Laterality Date    BILATERAL INSERTION OF EAR TUBES AND ADENOIDECTOMY      FACIAL RECONSTRUCTION SURGERY Right     RHINOPLASTY         Family History:  Family History   Problem Relation Age of Onset    Prostate cancer Paternal Grandfather     Lung cancer Paternal Uncle        Social History:   reports that she has quit smoking. She has never used smokeless tobacco. She reports that she does not currently use alcohol. She reports that she does not currently use drugs.    Medications:   Medications Prior to Admission   Medication Sig Dispense Refill Last Dose    levocetirizine (XYZAL) 5 MG tablet Take 1 tablet by mouth Daily. 90 tablet 1     sodium-potassium-magnesium sulfates (Suprep Bowel Prep Kit) 17.5-3.13-1.6 GM/177ML solution oral solution Take 1 bottle by mouth Every 12 (Twelve) Hours. 354 mL 0        Allergies:  Sulfacetamide sodium and Sulfa antibiotics    ROS:    Pertinent items are noted in HPI     Objective     Weight 66.2 kg (145 lb 15.1 oz), not currently breastfeeding.    Physical Exam   Constitutional: Pt is oriented to person, place, and time and well-developed, well-nourished, and in no distress.   Mouth/Throat: Oropharynx is clear and moist.   Neck: Normal range of motion.   Cardiovascular: Normal rate, regular rhythm and normal heart sounds.    Pulmonary/Chest: Effort normal and breath sounds normal.   Abdominal: Soft. Nontender  Skin: Skin is warm and dry.   Psychiatric: Mood, memory, affect and judgment normal.     Assessment & Plan     Diagnosis:  Screening colonoscopy    Anticipated Surgical Procedure:  Colonoscopy    The risks, benefits, and alternatives of this procedure have been discussed with the patient or  the responsible party- the patient understands and agrees to proceed.            No

## 2023-08-28 NOTE — ANESTHESIA POSTPROCEDURE EVALUATION
Patient: Montserrat De La Rosa    Procedure Summary       Date: 08/28/23 Room / Location: [facility] ENDOSCOPY 4 / [facility] ENDOSCOPY    Anesthesia Start: 0955 Anesthesia Stop: 1022    Procedure: COLONOSCOPY FOR SCREENING Diagnosis:       Colon cancer screening      (Colon cancer screening [Z12.11])    Surgeons: Kiana Foster MD Provider: Paul Velazquez CRNA    Anesthesia Type: general ASA Status: 2            Anesthesia Type: general    Vitals  Vitals Value Taken Time   /92 08/28/23 1036   Temp 36.4 øC (97.6 øF) 08/28/23 1021   Pulse 70 08/28/23 1036   Resp 14 08/28/23 1036   SpO2 100 % 08/28/23 1036           Post Anesthesia Care and Evaluation    Patient location during evaluation: bedside  Patient participation: complete - patient participated  Level of consciousness: awake  Pain management: adequate    Airway patency: patent  Anesthetic complications: No anesthetic complications  PONV Status: controlled  Cardiovascular status: acceptable and stable  Respiratory status: acceptable    
TLR

## 2023-08-30 NOTE — PROGRESS NOTES
"Well Woman Visit    CC: Scheduled annual well gyn visit  Chief Complaint   Patient presents with    Annual Exam       Prior Myriad or Hereditary Cancer testing (attach copy of result if yes): Does Not Qualify    HPI:   43 y.o.   Social History     Substance and Sexual Activity   Sexual Activity Yes    Partners: Male    Birth control/protection: None       44 y/o G0 with LMP 23 here for AP.  Menses q mo x 5 days with 1 day heavy.  She had colonoscopy and needs f/u 10 years.  She needs mammogram.  No CHARLINE.    PCP: does manage PMHx and preventative labs  History: PMHx, Meds, Allergies, PSHx, Social Hx, and POBHx all reviewed and updated.    PHYSICAL EXAM:  /91   Pulse 80   Ht 165.1 cm (65\")   Wt 66.2 kg (146 lb)   LMP 2023 (Exact Date)   Breastfeeding No   BMI 24.30 kg/m²  Not found.  General- NAD, alert and oriented, appropriate  Psych- Normal mood, good memory  Neck- No masses, no thyroid enlargement  CV- Regular rhythm, no murnurs  Resp- CTA to bases, no wheezes  Abdomen- Soft, non distended, non tender, no masses    Breast left-  Bilaterally symmetrical, no masses, non tender, no nipple discharge, no axillary or supraclavicular nodes palpable.    Breast right- Bilaterally symmetrical, no masses, non tender, no nipple discharge, no axillary or supraclavicular nodes palpable.      External genitalia- Normal female, no lesions  Urethra/meatus- Normal, no masses, non tender  Bladder- Normal, no masses, non tender  Vagina- Normal, no atrophy, no lesions, no discharge.    Cvx- Normal, no lesions, no discharge, No cervical motion tenderness  Uterus- Normal size, shape & consistency.  Non tender, mobile, & no prolapse  Adnexa- No mass, non tender  Anus/Rectum/Perineum- Not performed    Lymphatic- No palpable neck, axillary, or groin nodes  Ext- No edema, no cyanosis    Skin- No lesions, no rashes, no acanthosis nigricans      ASSESSMENT and PLAN:    Diagnoses and all orders for this " visit:    1. Well woman exam with routine gynecological exam (Primary)  -     IgP, Aptima HPV  -     Mammo Screening Digital Tomosynthesis Bilateral With CAD; Future    2. Elevated blood pressure reading   Pt to f/u with primary provider        Preventative:  BREAST HEALTH- Monthly self breast exam importance and how to reviewed. MMG and/or MRI (prn) reviewed per society guidelines and her individual history. Screen: Updated today  CERVICAL CANCER Screening- Reviewed current ASCCP guidelines for screening w and wo cotest HPV, age specific.  Screen: Updated today  COLON CANCER Screening- Reviewed current medical society guidelines and options.  Screen:  Already up to date  Follow up PCP/Specialist PMHx and Labs        She understands the importance of having any ordered tests to be performed in a timely fashion.  The risks of not performing them include, but are not limited to, advanced cancer stages, bone loss from osteoporosis and/or subsequent increase in morbidity and/or mortality.  She is encouraged to review her results online and/or contact or office if she has questions.     Follow Up:  Return in about 1 year (around 9/8/2024) for Annual physical.            Ruth Marin,   09/08/2023    [facility]  [address] [place] [address]  Dept: [phone]  Dept Fax: [phone]  Loc: [phone]  Loc Fax: [phone]

## 2023-09-07 ENCOUNTER — OFFICE VISIT (OUTPATIENT)
Dept: FAMILY MEDICINE CLINIC | Age: 43
End: 2023-09-07
Payer: COMMERCIAL

## 2023-09-07 VITALS
HEART RATE: 79 BPM | WEIGHT: 147.6 LBS | HEIGHT: 65 IN | DIASTOLIC BLOOD PRESSURE: 87 MMHG | SYSTOLIC BLOOD PRESSURE: 140 MMHG | BODY MASS INDEX: 24.59 KG/M2

## 2023-09-07 DIAGNOSIS — Z00.00 ANNUAL PHYSICAL EXAM: Primary | ICD-10-CM

## 2023-09-07 DIAGNOSIS — F41.1 GAD (GENERALIZED ANXIETY DISORDER): ICD-10-CM

## 2023-09-07 DIAGNOSIS — R03.0 WHITE COAT SYNDROME WITHOUT DIAGNOSIS OF HYPERTENSION: ICD-10-CM

## 2023-09-07 DIAGNOSIS — Z13.220 SCREENING FOR LIPID DISORDERS: ICD-10-CM

## 2023-09-07 DIAGNOSIS — Z00.00 ROUTINE ADULT HEALTH MAINTENANCE: ICD-10-CM

## 2023-09-07 DIAGNOSIS — J30.9 ALLERGIC RHINITIS, UNSPECIFIED SEASONALITY, UNSPECIFIED TRIGGER: ICD-10-CM

## 2023-09-07 DIAGNOSIS — Z13.29 SCREENING FOR THYROID DISORDER: ICD-10-CM

## 2023-09-07 RX ORDER — LEVOCETIRIZINE DIHYDROCHLORIDE 5 MG/1
5 TABLET, FILM COATED ORAL DAILY
Qty: 90 TABLET | Refills: 3 | Status: SHIPPED | OUTPATIENT
Start: 2023-09-07

## 2023-09-07 RX ORDER — LORATADINE AND PSEUDOEPHEDRINE SULFATE 5; 120 MG/1; MG/1
1 TABLET, EXTENDED RELEASE ORAL
Qty: 90 TABLET | Refills: 3 | Status: SHIPPED | OUTPATIENT
Start: 2023-09-07

## 2023-09-07 RX ORDER — PROPRANOLOL HYDROCHLORIDE 10 MG/1
10 TABLET ORAL 2 TIMES DAILY PRN
Qty: 60 TABLET | Refills: 1 | Status: SHIPPED | OUTPATIENT
Start: 2023-09-07

## 2023-09-07 NOTE — PROGRESS NOTES
"Montserrat De La Rosa presents to [facility] with complaint of  Annual Exam, anxiety, allergies    SUBJECTIVE  History of Present Illness    Patient is here for annual physical exam.  She is also wanting to discuss some issues with anxiety.  She is needing refills of Xyzal.  She takes this nightly for allergies.  Patient has also been having to take Claritin-D intermittently during the day. She was on this regimen years ago by allergist. She is asking for prescription for this.     In regards to her anxiety, this happens whenever she wakes up first thing in the morning.  She says she does have heart palpitations that last for a few seconds but denies any trouble breathing or chest pain.  Anxiety moment last only for a few minutes.  She denies any anxious this during the daytime.  Patient says she was on Zoloft in the past but this medication made her feel like a different person.  She says effected her personality and caused 50 pounds of weight gain.    The following portions of the patient's history were reviewed and updated as appropriate: allergies, current medications, past family history, past medical history, past social history, past surgical history and problem list.  She had screening colonoscopy completed.  She is scheduled to have Pap smear tomorrow.  Her blood pressure is elevated in office today.  It was also elevated at her annual physical last year.  For this reason she started checking her blood pressure at home over the past several days.  Blood pressure readings as follows 114/81, 120/80, 110/70, 115/80, 120/79, 110/81, 128/88, 120/75, 130/88, 118/76, 128/88, 120/84.  She sees dentist regularly.  She has no other issues or concerns she wishes to address today.     OBJECTIVE  Vital Signs:   /87 (BP Location: Left arm, Patient Position: Sitting)   Pulse 79   Ht 165.1 cm (65\")   Wt 67 kg (147 lb 9.6 oz)   BMI 24.56 kg/m²       Physical Exam  Vitals reviewed. "   Constitutional:       General: She is not in acute distress.     Appearance: Normal appearance. She is not ill-appearing.   HENT:      Head: Normocephalic and atraumatic.      Right Ear: Tympanic membrane and ear canal normal.      Left Ear: Tympanic membrane and ear canal normal.      Nose: Nose normal.      Mouth/Throat:      Mouth: Mucous membranes are moist.      Pharynx: Oropharynx is clear.   Neck:      Thyroid: No thyromegaly or thyroid tenderness.   Cardiovascular:      Rate and Rhythm: Normal rate and regular rhythm.      Pulses: Normal pulses.      Heart sounds: Normal heart sounds.   Pulmonary:      Effort: Pulmonary effort is normal.      Breath sounds: Normal breath sounds.   Musculoskeletal:      Cervical back: Neck supple.   Lymphadenopathy:      Cervical: No cervical adenopathy.   Skin:     General: Skin is warm and dry.   Neurological:      General: No focal deficit present.      Mental Status: She is alert and oriented to person, place, and time. Mental status is at baseline.   Psychiatric:         Mood and Affect: Mood normal.         Behavior: Behavior normal.         Judgment: Judgment normal.        Results Review:  The following data was reviewed by Keri De La Rosa, QING [unfilled] 13:02 EDT.  CBC & Differential (09/01/2022 12:09)  Comprehensive Metabolic Panel (08/26/2022 14:18)  CBC & Differential (08/26/2022 14:18)    ASSESSMENT AND PLAN:  Diagnoses and all orders for this visit:    1. Annual physical exam (Primary)    2. Routine adult health maintenance  -     CBC & Differential; Future  -     Comprehensive Metabolic Panel; Future    3. Screening for lipid disorders  -     Lipid Panel; Future    4. Screening for thyroid disorder  -     TSH Rfx On Abnormal To Free T4; Future    5. GAIL (generalized anxiety disorder)  -     propranolol (INDERAL) 10 MG tablet; Take 1 tablet by mouth 2 (Two) Times a Day As Needed (anxiety).  Dispense: 60 tablet; Refill: 1    6. White coat syndrome without diagnosis  of hypertension    7. Allergic rhinitis, unspecified seasonality, unspecified trigger  -     levocetirizine (XYZAL) 5 MG tablet; Take 1 tablet by mouth Daily.  Dispense: 90 tablet; Refill: 3  -     loratadine-pseudoephedrine (Claritin-D 12 Hour) 5-120 MG per 12 hr tablet; Take 1 tablet by mouth Daily.  Dispense: 90 tablet; Refill: 3      Discussed injury prevention, diet and exercise, and screening for common diseases. Encouraged use of sunscreen and seatbelts. Discussed timing of cervical cancer screening.  Avoidance of tobacco encouraged. Limitation or avoidance of alcohol encouraged. Recommend yearly dental and eye exams. Also discussed monitoring of blood pressure, lipids. Patient likely has whitecoat syndrome.  Her blood pressure readings at home are all normal.    For her anxiety, we will try propranolol 10 mg as needed up to twice per day.  Educated on medication side effects, monitor heart rate.  If heart rate consistently less than 50, she was instructed to stop taking medication.  Follow-up if anxiety is not improved or symptoms worsen, or daytime symptoms start occurring.      Follow Up   Return in about 1 year (around 9/7/2024), or if symptoms worsen or fail to improve. Patient to notify office with any acute concerns or issues.  Patient verbalizes understanding, agrees with plan of care and has no further questions upon discharge.     Patient was given instructions and counseling regarding her condition or for health maintenance advice. Please see specific information pulled into the AVS if appropriate.     Discussed the importance of following up with any needed screening tests/labs/specialist appointments and any requested follow-up recommended by me today. Importance of maintaining follow-up discussed and patient accepts that missed appointments can delay diagnosis and potentially lead to worsening of conditions.    Part of this note may be an electronic transcription/translation of spoken language  to printed text using the Dragon Dictation System.

## 2023-09-08 ENCOUNTER — OFFICE VISIT (OUTPATIENT)
Dept: OBSTETRICS AND GYNECOLOGY | Facility: CLINIC | Age: 43
End: 2023-09-08
Payer: COMMERCIAL

## 2023-09-08 VITALS
BODY MASS INDEX: 24.32 KG/M2 | WEIGHT: 146 LBS | SYSTOLIC BLOOD PRESSURE: 152 MMHG | HEART RATE: 80 BPM | HEIGHT: 65 IN | DIASTOLIC BLOOD PRESSURE: 91 MMHG

## 2023-09-08 DIAGNOSIS — Z01.419 WELL WOMAN EXAM WITH ROUTINE GYNECOLOGICAL EXAM: Primary | ICD-10-CM

## 2023-09-08 DIAGNOSIS — R03.0 ELEVATED BLOOD PRESSURE READING: ICD-10-CM

## 2023-09-08 PROCEDURE — 99396 PREV VISIT EST AGE 40-64: CPT | Performed by: OBSTETRICS & GYNECOLOGY

## 2023-09-13 LAB
CYTOLOGIST CVX/VAG CYTO: NORMAL
CYTOLOGY CVX/VAG DOC CYTO: NORMAL
CYTOLOGY CVX/VAG DOC THIN PREP: NORMAL
DX ICD CODE: NORMAL
HIV 1 & 2 AB SER-IMP: NORMAL
HPV I/H RISK 4 DNA CVX QL PROBE+SIG AMP: NEGATIVE
OTHER STN SPEC: NORMAL
STAT OF ADQ CVX/VAG CYTO-IMP: NORMAL

## 2023-09-19 ENCOUNTER — TELEPHONE (OUTPATIENT)
Dept: FAMILY MEDICINE CLINIC | Age: 43
End: 2023-09-19
Payer: COMMERCIAL

## 2023-09-22 ENCOUNTER — HOSPITAL ENCOUNTER (OUTPATIENT)
Dept: MAMMOGRAPHY | Facility: HOSPITAL | Age: 43
Discharge: HOME | End: 2023-09-22
Payer: COMMERCIAL

## 2023-09-22 ENCOUNTER — LAB (OUTPATIENT)
Dept: LAB | Facility: HOSPITAL | Age: 43
End: 2023-09-22
Payer: COMMERCIAL

## 2023-09-22 DIAGNOSIS — Z01.419 WELL WOMAN EXAM WITH ROUTINE GYNECOLOGICAL EXAM: ICD-10-CM

## 2023-09-22 DIAGNOSIS — Z13.220 SCREENING FOR LIPID DISORDERS: ICD-10-CM

## 2023-09-22 DIAGNOSIS — Z00.00 ROUTINE ADULT HEALTH MAINTENANCE: ICD-10-CM

## 2023-09-22 DIAGNOSIS — Z13.29 SCREENING FOR THYROID DISORDER: ICD-10-CM

## 2023-09-22 LAB
ALBUMIN SERPL-MCNC: 4.5 G/DL (ref 3.5–5.2)
ALBUMIN/GLOB SERPL: 2 G/DL
ALP SERPL-CCNC: 52 U/L (ref 39–117)
ALT SERPL W P-5'-P-CCNC: 7 U/L (ref 1–33)
ANION GAP SERPL CALCULATED.3IONS-SCNC: 9 MMOL/L (ref 5–15)
AST SERPL-CCNC: 15 U/L (ref 1–32)
BASOPHILS # BLD AUTO: 0.04 10*3/MM3 (ref 0–0.2)
BASOPHILS NFR BLD AUTO: 0.9 % (ref 0–1.5)
BILIRUB SERPL-MCNC: 0.3 MG/DL (ref 0–1.2)
BUN SERPL-MCNC: 11 MG/DL (ref 6–20)
BUN/CREAT SERPL: 13.9 (ref 7–25)
CALCIUM SPEC-SCNC: 9.3 MG/DL (ref 8.6–10.5)
CHLORIDE SERPL-SCNC: 103 MMOL/L (ref 98–107)
CHOLEST SERPL-MCNC: 186 MG/DL (ref 0–200)
CO2 SERPL-SCNC: 28 MMOL/L (ref 22–29)
CREAT SERPL-MCNC: 0.79 MG/DL (ref 0.57–1)
DEPRECATED RDW RBC AUTO: 41.2 FL (ref 37–54)
EGFRCR SERPLBLD CKD-EPI 2021: 94.1 ML/MIN/1.73
EOSINOPHIL # BLD AUTO: 0.16 10*3/MM3 (ref 0–0.4)
EOSINOPHIL NFR BLD AUTO: 3.5 % (ref 0.3–6.2)
ERYTHROCYTE [DISTWIDTH] IN BLOOD BY AUTOMATED COUNT: 13.5 % (ref 12.3–15.4)
GLOBULIN UR ELPH-MCNC: 2.2 GM/DL
GLUCOSE SERPL-MCNC: 80 MG/DL (ref 65–99)
HCT VFR BLD AUTO: 38.7 % (ref 34–46.6)
HDLC SERPL-MCNC: 70 MG/DL (ref 40–60)
HGB BLD-MCNC: 12.5 G/DL (ref 12–15.9)
IMM GRANULOCYTES # BLD AUTO: 0 10*3/MM3 (ref 0–0.05)
IMM GRANULOCYTES NFR BLD AUTO: 0 % (ref 0–0.5)
LDLC SERPL CALC-MCNC: 104 MG/DL (ref 0–100)
LDLC/HDLC SERPL: 1.47 {RATIO}
LYMPHOCYTES # BLD AUTO: 1.6 10*3/MM3 (ref 0.7–3.1)
LYMPHOCYTES NFR BLD AUTO: 35 % (ref 19.6–45.3)
MCH RBC QN AUTO: 26.6 PG (ref 26.6–33)
MCHC RBC AUTO-ENTMCNC: 32.3 G/DL (ref 31.5–35.7)
MCV RBC AUTO: 82.3 FL (ref 79–97)
MONOCYTES # BLD AUTO: 0.4 10*3/MM3 (ref 0.1–0.9)
MONOCYTES NFR BLD AUTO: 8.8 % (ref 5–12)
NEUTROPHILS NFR BLD AUTO: 2.37 10*3/MM3 (ref 1.7–7)
NEUTROPHILS NFR BLD AUTO: 51.8 % (ref 42.7–76)
PLATELET # BLD AUTO: 242 10*3/MM3 (ref 140–450)
PMV BLD AUTO: 11.6 FL (ref 6–12)
POTASSIUM SERPL-SCNC: 3.7 MMOL/L (ref 3.5–5.2)
PROT SERPL-MCNC: 6.7 G/DL (ref 6–8.5)
RBC # BLD AUTO: 4.7 10*6/MM3 (ref 3.77–5.28)
SODIUM SERPL-SCNC: 140 MMOL/L (ref 136–145)
TRIGL SERPL-MCNC: 64 MG/DL (ref 0–150)
TSH SERPL DL<=0.05 MIU/L-ACNC: 1.58 UIU/ML (ref 0.27–4.2)
VLDLC SERPL-MCNC: 12 MG/DL (ref 5–40)
WBC NRBC COR # BLD: 4.57 10*3/MM3 (ref 3.4–10.8)

## 2023-09-22 PROCEDURE — 77067 SCR MAMMO BI INCL CAD: CPT

## 2023-09-22 PROCEDURE — 36415 COLL VENOUS BLD VENIPUNCTURE: CPT

## 2023-09-22 PROCEDURE — 80050 GENERAL HEALTH PANEL: CPT

## 2023-09-22 PROCEDURE — 77063 BREAST TOMOSYNTHESIS BI: CPT

## 2023-09-22 PROCEDURE — 80061 LIPID PANEL: CPT

## 2023-10-30 DIAGNOSIS — F41.1 GAD (GENERALIZED ANXIETY DISORDER): ICD-10-CM

## 2023-10-30 RX ORDER — PROPRANOLOL HYDROCHLORIDE 10 MG/1
10 TABLET ORAL 2 TIMES DAILY PRN
Qty: 60 TABLET | Refills: 1 | Status: SHIPPED | OUTPATIENT
Start: 2023-10-30

## 2023-12-26 DIAGNOSIS — F41.1 GAD (GENERALIZED ANXIETY DISORDER): ICD-10-CM

## 2023-12-26 RX ORDER — PROPRANOLOL HYDROCHLORIDE 10 MG/1
10 TABLET ORAL 2 TIMES DAILY PRN
Qty: 60 TABLET | Refills: 1 | Status: SHIPPED | OUTPATIENT
Start: 2023-12-26

## 2024-02-23 DIAGNOSIS — F41.1 GAD (GENERALIZED ANXIETY DISORDER): ICD-10-CM

## 2024-02-26 RX ORDER — PROPRANOLOL HYDROCHLORIDE 10 MG/1
10 TABLET ORAL 2 TIMES DAILY PRN
Qty: 60 TABLET | Refills: 1 | Status: SHIPPED | OUTPATIENT
Start: 2024-02-26

## 2024-07-11 ENCOUNTER — OFFICE VISIT (OUTPATIENT)
Dept: FAMILY MEDICINE CLINIC | Age: 44
End: 2024-07-11
Payer: COMMERCIAL

## 2024-07-11 VITALS
HEART RATE: 71 BPM | SYSTOLIC BLOOD PRESSURE: 144 MMHG | HEIGHT: 65 IN | DIASTOLIC BLOOD PRESSURE: 94 MMHG | BODY MASS INDEX: 24.49 KG/M2 | WEIGHT: 147 LBS

## 2024-07-11 DIAGNOSIS — Z13.220 SCREENING FOR LIPID DISORDERS: ICD-10-CM

## 2024-07-11 DIAGNOSIS — F41.1 GAD (GENERALIZED ANXIETY DISORDER): Primary | ICD-10-CM

## 2024-07-11 DIAGNOSIS — Z11.59 ENCOUNTER FOR HEPATITIS C SCREENING TEST FOR LOW RISK PATIENT: ICD-10-CM

## 2024-07-11 DIAGNOSIS — Z00.00 ROUTINE ADULT HEALTH MAINTENANCE: ICD-10-CM

## 2024-07-11 DIAGNOSIS — Z13.29 SCREENING FOR THYROID DISORDER: ICD-10-CM

## 2024-07-11 DIAGNOSIS — J30.9 ALLERGIC RHINITIS, UNSPECIFIED SEASONALITY, UNSPECIFIED TRIGGER: ICD-10-CM

## 2024-07-11 DIAGNOSIS — Z12.31 ENCOUNTER FOR SCREENING MAMMOGRAM FOR MALIGNANT NEOPLASM OF BREAST: ICD-10-CM

## 2024-07-11 PROCEDURE — 99214 OFFICE O/P EST MOD 30 MIN: CPT | Performed by: NURSE PRACTITIONER

## 2024-07-11 RX ORDER — LORATADINE AND PSEUDOEPHEDRINE SULFATE 5; 120 MG/1; MG/1
1 TABLET, EXTENDED RELEASE ORAL
Qty: 90 TABLET | Refills: 3 | Status: SHIPPED | OUTPATIENT
Start: 2024-07-11

## 2024-07-11 RX ORDER — LEVOCETIRIZINE DIHYDROCHLORIDE 5 MG/1
5 TABLET, FILM COATED ORAL DAILY
Qty: 90 TABLET | Refills: 3 | Status: SHIPPED | OUTPATIENT
Start: 2024-07-11

## 2024-07-11 RX ORDER — PROPRANOLOL HYDROCHLORIDE 10 MG/1
10 TABLET ORAL 2 TIMES DAILY PRN
Qty: 60 TABLET | Refills: 2 | Status: SHIPPED | OUTPATIENT
Start: 2024-07-11

## 2024-07-11 NOTE — PROGRESS NOTES
"Montserrat De La Rosa presents to [facility] with complaint of  Anxiety    SUBJECTIVE  History of Present Illness    Patient is here for follow-up of anxiety.  Patient was diagnosed with ADHD, her last visit here in September of last year, she was interested in trying medication to help with her anxiety symptoms likely caused by ADHD.  She was also having heart palpitations/heart racing.  Patient says that the propranolol does seem to help control her heart palpitations, occasionally will help with anxiety thoughts.  She also has allergies and is needing Xyzal and Claritin-D refilled.  She will be due for annual labs in September as well as mammogram.  She is up-to-date on colon cancer screening and Pap smear.  Exam is up-to-date as well.  She has no other issues or concerns that she would like to address today.      OBJECTIVE  Vital Signs:   /94 (BP Location: Left arm, Patient Position: Sitting)   Pulse 71   Ht 165.1 cm (65\")   Wt 66.7 kg (147 lb)   BMI 24.46 kg/m²       Physical Exam  Vitals reviewed.   Constitutional:       General: She is not in acute distress.     Appearance: Normal appearance. She is not ill-appearing.   HENT:      Head: Normocephalic and atraumatic.      Right Ear: Tympanic membrane and ear canal normal.      Left Ear: Tympanic membrane and ear canal normal.      Nose: Nose normal.      Mouth/Throat:      Mouth: Mucous membranes are moist.      Pharynx: Oropharynx is clear.   Neck:      Thyroid: No thyroid mass, thyromegaly or thyroid tenderness.   Cardiovascular:      Rate and Rhythm: Normal rate and regular rhythm.      Pulses: Normal pulses.      Heart sounds: Normal heart sounds.   Pulmonary:      Effort: Pulmonary effort is normal.      Breath sounds: Normal breath sounds.   Musculoskeletal:      Cervical back: Neck supple.   Lymphadenopathy:      Cervical: No cervical adenopathy.   Skin:     General: Skin is warm and dry.   Neurological:      " General: No focal deficit present.      Mental Status: She is alert and oriented to person, place, and time. Mental status is at baseline.   Psychiatric:         Mood and Affect: Mood normal.         Behavior: Behavior normal.         Judgment: Judgment normal.            ASSESSMENT AND PLAN:  Diagnoses and all orders for this visit:    1. GAIL (generalized anxiety disorder) (Primary)  -     propranolol (INDERAL) 10 MG tablet; Take 1 tablet by mouth 2 (Two) Times a Day As Needed (anxiety).  Dispense: 60 tablet; Refill: 2    2. Allergic rhinitis, unspecified seasonality, unspecified trigger  -     levocetirizine (XYZAL) 5 MG tablet; Take 1 tablet by mouth Daily.  Dispense: 90 tablet; Refill: 3  -     loratadine-pseudoephedrine (Claritin-D 12 Hour) 5-120 MG per 12 hr tablet; Take 1 tablet by mouth Daily.  Dispense: 90 tablet; Refill: 3    3. Screening for thyroid disorder  -     TSH Rfx On Abnormal To Free T4; Future    4. Encounter for hepatitis C screening test for low risk patient  -     Hepatitis C Antibody; Future    5. Screening for lipid disorders  -     Lipid Panel; Future    6. Routine adult health maintenance  -     CBC & Differential; Future  -     Comprehensive Metabolic Panel; Future    7. Encounter for screening mammogram for malignant neoplasm of breast  -     Mammo Screening Digital Tomosynthesis Bilateral With CAD; Future      Anxiety is controlled with propranolol.  Patient would like to continue this medication.  Refills provided.  Refilled Xyzal and Claritin.  Screening labs ordered to be completed in September as well as mammogram.      Follow Up   No follow-ups on file. Patient to notify office with any acute concerns or issues.  Patient verbalizes understanding, agrees with plan of care and has no further questions upon discharge.     Patient was given instructions and counseling regarding her condition or for health maintenance advice. Please see specific information pulled into the AVS if  appropriate.     Discussed the importance of following up with any needed screening tests/labs/specialist appointments and any requested follow-up recommended by me today. Importance of maintaining follow-up discussed and patient accepts that missed appointments can delay diagnosis and potentially lead to worsening of conditions.    Part of this note may be an electronic transcription/translation of spoken language to printed text using the Dragon Dictation System.

## 2024-09-18 ENCOUNTER — OFFICE VISIT (OUTPATIENT)
Dept: OBSTETRICS AND GYNECOLOGY | Facility: CLINIC | Age: 44
End: 2024-09-18
Payer: COMMERCIAL

## 2024-09-18 VITALS
DIASTOLIC BLOOD PRESSURE: 95 MMHG | HEART RATE: 69 BPM | HEIGHT: 65 IN | WEIGHT: 146 LBS | SYSTOLIC BLOOD PRESSURE: 195 MMHG | BODY MASS INDEX: 24.32 KG/M2

## 2024-09-18 DIAGNOSIS — Z01.419 WELL WOMAN EXAM WITH ROUTINE GYNECOLOGICAL EXAM: Primary | ICD-10-CM

## 2024-09-18 PROCEDURE — G0123 SCREEN CERV/VAG THIN LAYER: HCPCS | Performed by: OBSTETRICS & GYNECOLOGY

## 2024-09-18 PROCEDURE — 99396 PREV VISIT EST AGE 40-64: CPT | Performed by: OBSTETRICS & GYNECOLOGY

## 2024-09-24 ENCOUNTER — HOSPITAL ENCOUNTER (OUTPATIENT)
Dept: MAMMOGRAPHY | Facility: HOSPITAL | Age: 44
Discharge: HOME | End: 2024-09-24
Admitting: NURSE PRACTITIONER
Payer: COMMERCIAL

## 2024-09-24 DIAGNOSIS — Z12.31 ENCOUNTER FOR SCREENING MAMMOGRAM FOR MALIGNANT NEOPLASM OF BREAST: ICD-10-CM

## 2024-09-24 LAB
CONV .: NORMAL
CYTOLOGIST CVX/VAG CYTO: NORMAL
CYTOLOGY CVX/VAG DOC CYTO: NORMAL
CYTOLOGY CVX/VAG DOC THIN PREP: NORMAL
DX ICD CODE: NORMAL
Lab: NORMAL
Lab: NORMAL
OTHER STN SPEC: NORMAL
STAT OF ADQ CVX/VAG CYTO-IMP: NORMAL

## 2024-09-24 PROCEDURE — 77067 SCR MAMMO BI INCL CAD: CPT

## 2024-09-24 PROCEDURE — 77063 BREAST TOMOSYNTHESIS BI: CPT

## 2024-10-21 ENCOUNTER — TELEPHONE (OUTPATIENT)
Dept: FAMILY MEDICINE CLINIC | Age: 44
End: 2024-10-21
Payer: COMMERCIAL

## 2024-11-01 ENCOUNTER — LAB (OUTPATIENT)
Dept: LAB | Facility: HOSPITAL | Age: 44
End: 2024-11-01
Payer: COMMERCIAL

## 2024-11-01 DIAGNOSIS — Z13.220 SCREENING FOR LIPID DISORDERS: ICD-10-CM

## 2024-11-01 DIAGNOSIS — Z13.29 SCREENING FOR THYROID DISORDER: ICD-10-CM

## 2024-11-01 DIAGNOSIS — Z00.00 ROUTINE ADULT HEALTH MAINTENANCE: ICD-10-CM

## 2024-11-01 DIAGNOSIS — Z11.59 ENCOUNTER FOR HEPATITIS C SCREENING TEST FOR LOW RISK PATIENT: ICD-10-CM

## 2024-11-01 LAB
ALBUMIN SERPL-MCNC: 4.5 G/DL (ref 3.5–5.2)
ALBUMIN/GLOB SERPL: 1.6 G/DL
ALP SERPL-CCNC: 69 U/L (ref 39–117)
ALT SERPL W P-5'-P-CCNC: 46 U/L (ref 1–33)
ANION GAP SERPL CALCULATED.3IONS-SCNC: 11.7 MMOL/L (ref 5–15)
AST SERPL-CCNC: 54 U/L (ref 1–32)
BASOPHILS # BLD AUTO: 0.04 10*3/MM3 (ref 0–0.2)
BASOPHILS NFR BLD AUTO: 0.7 % (ref 0–1.5)
BILIRUB SERPL-MCNC: 0.3 MG/DL (ref 0–1.2)
BUN SERPL-MCNC: 9 MG/DL (ref 6–20)
BUN/CREAT SERPL: 11.5 (ref 7–25)
CALCIUM SPEC-SCNC: 9.3 MG/DL (ref 8.6–10.5)
CHLORIDE SERPL-SCNC: 100 MMOL/L (ref 98–107)
CHOLEST SERPL-MCNC: 240 MG/DL (ref 0–200)
CO2 SERPL-SCNC: 25.3 MMOL/L (ref 22–29)
CREAT SERPL-MCNC: 0.78 MG/DL (ref 0.57–1)
DEPRECATED RDW RBC AUTO: 41.9 FL (ref 37–54)
EGFRCR SERPLBLD CKD-EPI 2021: 95 ML/MIN/1.73
EOSINOPHIL # BLD AUTO: 0.13 10*3/MM3 (ref 0–0.4)
EOSINOPHIL NFR BLD AUTO: 2.4 % (ref 0.3–6.2)
ERYTHROCYTE [DISTWIDTH] IN BLOOD BY AUTOMATED COUNT: 14.5 % (ref 12.3–15.4)
GLOBULIN UR ELPH-MCNC: 2.9 GM/DL
GLUCOSE SERPL-MCNC: 89 MG/DL (ref 65–99)
HCT VFR BLD AUTO: 39.7 % (ref 34–46.6)
HCV AB SER QL: NORMAL
HDLC SERPL-MCNC: 100 MG/DL (ref 40–60)
HGB BLD-MCNC: 12.3 G/DL (ref 12–15.9)
IMM GRANULOCYTES # BLD AUTO: 0 10*3/MM3 (ref 0–0.05)
IMM GRANULOCYTES NFR BLD AUTO: 0 % (ref 0–0.5)
LDLC SERPL CALC-MCNC: 128 MG/DL (ref 0–100)
LDLC/HDLC SERPL: 1.26 {RATIO}
LYMPHOCYTES # BLD AUTO: 1.6 10*3/MM3 (ref 0.7–3.1)
LYMPHOCYTES NFR BLD AUTO: 29.4 % (ref 19.6–45.3)
MCH RBC QN AUTO: 24.2 PG (ref 26.6–33)
MCHC RBC AUTO-ENTMCNC: 31 G/DL (ref 31.5–35.7)
MCV RBC AUTO: 78 FL (ref 79–97)
MONOCYTES # BLD AUTO: 0.49 10*3/MM3 (ref 0.1–0.9)
MONOCYTES NFR BLD AUTO: 9 % (ref 5–12)
NEUTROPHILS NFR BLD AUTO: 3.18 10*3/MM3 (ref 1.7–7)
NEUTROPHILS NFR BLD AUTO: 58.5 % (ref 42.7–76)
PLATELET # BLD AUTO: 315 10*3/MM3 (ref 140–450)
PMV BLD AUTO: 11.9 FL (ref 6–12)
POTASSIUM SERPL-SCNC: 3.8 MMOL/L (ref 3.5–5.2)
PROT SERPL-MCNC: 7.4 G/DL (ref 6–8.5)
RBC # BLD AUTO: 5.09 10*6/MM3 (ref 3.77–5.28)
SODIUM SERPL-SCNC: 137 MMOL/L (ref 136–145)
TRIGL SERPL-MCNC: 71 MG/DL (ref 0–150)
TSH SERPL DL<=0.05 MIU/L-ACNC: 2.14 UIU/ML (ref 0.27–4.2)
VLDLC SERPL-MCNC: 12 MG/DL (ref 5–40)
WBC NRBC COR # BLD AUTO: 5.44 10*3/MM3 (ref 3.4–10.8)

## 2024-11-01 PROCEDURE — 86803 HEPATITIS C AB TEST: CPT

## 2024-11-01 PROCEDURE — 80050 GENERAL HEALTH PANEL: CPT

## 2024-11-01 PROCEDURE — 36415 COLL VENOUS BLD VENIPUNCTURE: CPT

## 2024-11-01 PROCEDURE — 80061 LIPID PANEL: CPT

## 2024-12-05 ENCOUNTER — OFFICE VISIT (OUTPATIENT)
Dept: FAMILY MEDICINE CLINIC | Age: 44
End: 2024-12-05
Payer: COMMERCIAL

## 2024-12-05 VITALS
OXYGEN SATURATION: 99 % | WEIGHT: 148 LBS | DIASTOLIC BLOOD PRESSURE: 91 MMHG | SYSTOLIC BLOOD PRESSURE: 157 MMHG | TEMPERATURE: 98.2 F | BODY MASS INDEX: 24.66 KG/M2 | HEIGHT: 65 IN | HEART RATE: 83 BPM

## 2024-12-05 DIAGNOSIS — F41.1 GAD (GENERALIZED ANXIETY DISORDER): Primary | ICD-10-CM

## 2024-12-05 DIAGNOSIS — R03.0 WHITE COAT SYNDROME WITHOUT DIAGNOSIS OF HYPERTENSION: ICD-10-CM

## 2024-12-05 DIAGNOSIS — F90.1 ATTENTION DEFICIT HYPERACTIVITY DISORDER (ADHD), PREDOMINANTLY HYPERACTIVE TYPE: ICD-10-CM

## 2024-12-05 DIAGNOSIS — R03.0 ELEVATED BLOOD PRESSURE READING: ICD-10-CM

## 2024-12-05 DIAGNOSIS — R74.8 ELEVATED LIVER ENZYMES: ICD-10-CM

## 2024-12-05 PROCEDURE — 99214 OFFICE O/P EST MOD 30 MIN: CPT | Performed by: NURSE PRACTITIONER

## 2024-12-05 RX ORDER — CLONIDINE HYDROCHLORIDE 0.1 MG/1
0.1 TABLET ORAL 2 TIMES DAILY PRN
Qty: 90 TABLET | Refills: 0 | Status: SHIPPED | OUTPATIENT
Start: 2024-12-05

## 2024-12-05 NOTE — PROGRESS NOTES
"Montserrat De La Rosa presents to [facility] with complaint of  Hypertension (High BP readings at home due to stress )    SUBJECTIVE    Patient is here to discuss elevated blood pressure readings.  She was at OB/GYN office recently and blood pressure was very elevated there.  Previous blood pressures here have also been elevated.  Patient has known whitecoat syndrome.  She says whenever she checks her blood pressure at home it is always in the 120s systolic.  Patient has known ADHD which was diagnosed several years ago.  She was never on medication specifically for ADHD but she has been on Zoloft and Wellbutrin in the past.  Patient has also been on propranolol.  Propranolol was initially working however it is no longer.    Patient has become concerned as she feels like her blood pressure elevations are becoming more frequent due to her mental health symptoms.    Patient had labs completed recently that showed elevated liver enzymes.  Other labs unremarkable.  Patient admits to not eating the healthiest, was on vacation prior to these labs being drawn.  Did not use any alcohol but admits to using THC.    OBJECTIVE  Vital Signs:   /91 (BP Location: Right arm, Patient Position: Sitting, Cuff Size: Adult)   Pulse 83   Temp 98.2 °F (36.8 °C) (Oral)   Ht 165.1 cm (65\")   Wt 67.1 kg (148 lb)   SpO2 99%   BMI 24.63 kg/m²       Physical Exam  Vitals reviewed.   Constitutional:       General: She is not in acute distress.     Appearance: Normal appearance. She is not ill-appearing.   HENT:      Head: Normocephalic and atraumatic.      Nose: Nose normal.      Mouth/Throat:      Mouth: Mucous membranes are moist.      Pharynx: Oropharynx is clear.   Cardiovascular:      Rate and Rhythm: Normal rate and regular rhythm.      Pulses: Normal pulses.      Heart sounds: Normal heart sounds.   Pulmonary:      Effort: Pulmonary effort is normal.      Breath sounds: Normal breath sounds. "   Musculoskeletal:      Cervical back: Neck supple.   Skin:     General: Skin is warm and dry.   Neurological:      General: No focal deficit present.      Mental Status: She is alert and oriented to person, place, and time. Mental status is at baseline.   Psychiatric:         Mood and Affect: Mood is anxious.         Behavior: Behavior is hyperactive.         Judgment: Judgment normal.          Results Review:  The following data was reviewed by Keri De La Rosa, QING [unfilled] 14:35 EST.    Lab on 11/01/2024   Component Date Value Ref Range Status    Glucose 11/01/2024 89  65 - 99 mg/dL Final    BUN 11/01/2024 9  6 - 20 mg/dL Final    Creatinine 11/01/2024 0.78  0.57 - 1.00 mg/dL Final    Sodium 11/01/2024 137  136 - 145 mmol/L Final    Potassium 11/01/2024 3.8  3.5 - 5.2 mmol/L Final    Chloride 11/01/2024 100  98 - 107 mmol/L Final    CO2 11/01/2024 25.3  22.0 - 29.0 mmol/L Final    Calcium 11/01/2024 9.3  8.6 - 10.5 mg/dL Final    Total Protein 11/01/2024 7.4  6.0 - 8.5 g/dL Final    Albumin 11/01/2024 4.5  3.5 - 5.2 g/dL Final    ALT (SGPT) 11/01/2024 46 (H)  1 - 33 U/L Final    AST (SGOT) 11/01/2024 54 (H)  1 - 32 U/L Final    Alkaline Phosphatase 11/01/2024 69  39 - 117 U/L Final    Total Bilirubin 11/01/2024 0.3  0.0 - 1.2 mg/dL Final    Globulin 11/01/2024 2.9  gm/dL Final    A/G Ratio 11/01/2024 1.6  g/dL Final    BUN/Creatinine Ratio 11/01/2024 11.5  7.0 - 25.0 Final    Anion Gap 11/01/2024 11.7  5.0 - 15.0 mmol/L Final    eGFR 11/01/2024 95.0  >60.0 mL/min/1.73 Final    Total Cholesterol 11/01/2024 240 (H)  0 - 200 mg/dL Final    Triglycerides 11/01/2024 71  0 - 150 mg/dL Final    HDL Cholesterol 11/01/2024 100 (H)  40 - 60 mg/dL Final    LDL Cholesterol  11/01/2024 128 (H)  0 - 100 mg/dL Final    VLDL Cholesterol 11/01/2024 12  5 - 40 mg/dL Final    LDL/HDL Ratio 11/01/2024 1.26   Final    TSH 11/01/2024 2.140  0.270 - 4.200 uIU/mL Final    Hepatitis C Ab 11/01/2024 Non-Reactive  Non-Reactive Final    WBC  11/01/2024 5.44  3.40 - 10.80 10*3/mm3 Final    RBC 11/01/2024 5.09  3.77 - 5.28 10*6/mm3 Final    Hemoglobin 11/01/2024 12.3  12.0 - 15.9 g/dL Final    Hematocrit 11/01/2024 39.7  34.0 - 46.6 % Final    MCV 11/01/2024 78.0 (L)  79.0 - 97.0 fL Final    MCH 11/01/2024 24.2 (L)  26.6 - 33.0 pg Final    MCHC 11/01/2024 31.0 (L)  31.5 - 35.7 g/dL Final    RDW 11/01/2024 14.5  12.3 - 15.4 % Final    RDW-SD 11/01/2024 41.9  37.0 - 54.0 fl Final    MPV 11/01/2024 11.9  6.0 - 12.0 fL Final    Platelets 11/01/2024 315  140 - 450 10*3/mm3 Final    Neutrophil % 11/01/2024 58.5  42.7 - 76.0 % Final    Lymphocyte % 11/01/2024 29.4  19.6 - 45.3 % Final    Monocyte % 11/01/2024 9.0  5.0 - 12.0 % Final    Eosinophil % 11/01/2024 2.4  0.3 - 6.2 % Final    Basophil % 11/01/2024 0.7  0.0 - 1.5 % Final    Immature Grans % 11/01/2024 0.0  0.0 - 0.5 % Final    Neutrophils, Absolute 11/01/2024 3.18  1.70 - 7.00 10*3/mm3 Final    Lymphocytes, Absolute 11/01/2024 1.60  0.70 - 3.10 10*3/mm3 Final    Monocytes, Absolute 11/01/2024 0.49  0.10 - 0.90 10*3/mm3 Final    Eosinophils, Absolute 11/01/2024 0.13  0.00 - 0.40 10*3/mm3 Final    Basophils, Absolute 11/01/2024 0.04  0.00 - 0.20 10*3/mm3 Final    Immature Grans, Absolute 11/01/2024 0.00  0.00 - 0.05 10*3/mm3 Final   Office Visit on 09/18/2024   Component Date Value Ref Range Status    Diagnosis 09/18/2024 Comment   Final    NEGATIVE FOR INTRAEPITHELIAL LESION OR MALIGNANCY.  CELLULAR CHANGES ASSOCIATED WITH INFLAMMATION ARE PRESENT.  THIS SPECIMEN WAS RESCREENED AS PART OF OUR  PROGRAM.    Specimen adequacy: 09/18/2024 Comment   Final    Satisfactory for evaluation.  Endocervical and/or squamous metaplastic  cells (endocervical component) are present.    Clinician Provided ICD-10: 09/18/2024 Comment   Final    Z01.419    Performed by: 09/18/2024 Comment   Final    Sreedhar Arcos, Cytotechnologist (ASCP)    QC reviewed by: 09/18/2024 Comment   Final    Pilar Faria,  Supervisory Cytotechnologist (ASCP)    . 09/18/2024 .   Final    Note: 09/18/2024 Comment   Final    The Pap smear is a screening test designed to aid in the detection of  premalignant and malignant conditions of the uterine cervix.  It is not a  diagnostic procedure and should not be used as the sole means of detecting  cervical cancer.  Both false-positive and false-negative reports do occur.    Method: 09/18/2024 Comment   Final    This liquid based ThinPrep(R) pap test was screened with the  use of an image guided system.    Conv .conv 09/18/2024 Comment   Final    The HPV DNA reflex criteria were not met with this specimen result  therefore, no HPV testing was performed.         ASSESSMENT AND PLAN:  Diagnoses and all orders for this visit:    1. GAIL (generalized anxiety disorder) (Primary)  -     Ambulatory Referral to Behavioral Health    2. Elevated liver enzymes  -     Comprehensive metabolic panel; Future    3. Elevated blood pressure reading    4. White coat syndrome without diagnosis of hypertension    5. Attention deficit hyperactivity disorder (ADHD), predominantly hyperactive type  -     Ambulatory Referral to Behavioral Health    Other orders  -     cloNIDine (Catapres) 0.1 MG tablet; Take 1 tablet by mouth 2 (Two) Times a Day As Needed for High Blood Pressure (take only if /100 or higher).  Dispense: 90 tablet; Refill: 0        Since patient's blood pressures are normal when she checks them at home, do not think starting a daily antihypertensive is needed.  Instead, she was given clonidine to use as needed for elevated blood pressure 160/100 or higher.  Clonidine may also help her anxiety symptoms.  Discussed with patient that getting her anxiety, stress and ADHD symptoms under control would hopefully help better control her blood pressure.  Recommend referral to psychiatry.  Patient is agreeable.      CMP placed to recheck liver enzymes in the next month or so.        Follow Up   No  follow-ups on file. Patient to notify office with any acute concerns or issues.  Patient verbalizes understanding, agrees with plan of care and has no further questions upon discharge.     Patient was given instructions and counseling regarding her condition or for health maintenance advice. Please see specific information pulled into the AVS if appropriate.     Discussed the importance of following up with any needed screening tests/labs/specialist appointments and any requested follow-up recommended by me today. Importance of maintaining follow-up discussed and patient accepts that missed appointments can delay diagnosis and potentially lead to worsening of conditions.    Part of this note may be an electronic transcription/translation of spoken language to printed text using the Dragon Dictation System.

## 2024-12-09 NOTE — PROGRESS NOTES
"[facility]  Initial Evaluation    Referring Provider:  Keri De La Rosa, APRN  [address] [facility]  [address]  [place] [address]    Chief Complaint: \"My blood pressure in certain situations started to spike... I went to my PCP thinking I needed a BP medication... but she said it's very much the opposite... situational... my blood pressure isn't usually high.\"    History of Present Illness: Montserrat De La Rosa is a 44 y.o. female who presents today for initial evaluation regarding situational anxiety, concentration deficits. She presents unaccompanied in no acute distress and engages with me appropriately.     History is positive for signs/symptoms suggestive of ADHD and situational anxiety: trouble concentrating, trouble completing tasks, making errors in routine tasks, issues remembering obligations, trouble with organization, fidgeting, and associated irritability/anxiety with these deficits, anticipatory energy that can be distracting or bothersome in unfamiliar situations or settings (sounds to be contributory to intermittent hypertensive episodes). With regard to salient bothersome symptoms - overstimulation, difficulty processing multiple inputs contributory to frustration in relationships and interfacing with the public at work. In the past strenuous exercise was helpful, but this has been harder in recent years and she worries about strain on her joints. Discussed practice of meditation. Counseled with regard to use of clonidine prior to triggering engagements.    In general Hue presents as an amicable and polite individual with a high level of inviting social energy. Her mood is generally good and she is appreciative of her life circumstances. She communicates she'd like to experience less anticipatory/situational anxiety and does believe that perhaps a greater cultivation of focus/concentration could help to convey this change. I've advised treating ADHD could be " "an effective means by which this is achieved, though these medications do carry the potential SE of enhancing hypertension in some cases. She is amenable to a trial of medication.    I have counseled the patient with regard to diagnoses and the recommended treatment regimen as documented below: I will begin atomoxetine for ADHD and have advised this medication can contribute to issues with BP and HR, appetite suppression, insomnia, and mood changes with worst case scenario being new-onset SI; patient contracts for safety appropriately. Patient acknowledges the diagnoses per my rendered interpretation. Patient demonstrates awareness/understanding of viable alternatives for treatment as well as potential risks, benefits, and side effects associated with this regimen and is amenable to proceed in this fashion.     Psychiatric History:  Diagnoses: ADHD (diagnosed in elementary school), social anxiety  Outpatient history: denies  Inpatient history: denies  Medication trials: sertraline (affective dulling, weight gain)  Other treatment modalities: denies therapy hx  Presenting regimen: N/A  Self harm: denies  Suicide attempts: denies    Substance Abuse History:   Types/methods/frequency: THC rarely     Social History:  Residence: lives in a house with her  ( 10 years, together 20 years) and their rescued animals  Vocation: MRI technician  Education: radiology certification after HS, one class away from associate's degree; [employer]  Pertinent developmental history: denies abuse hx  Pertinent legal history: denies  Hobbies/interests: traveling, reading, yoga, cooking  Christianity: \"spiritual... I was raised Presbyterian\"   Exercise: works out regularly  Dietary habits: defer  Sleep hygiene: defer  Social habits: no pertinent issues  Sunlight: no concern for under-exposure  Caffeine intake: no pertinent issues; 1 glass of sweet tea daily  Hydration habits: no pertinent issues   history: " denies    Social History     Socioeconomic History    Marital status:    Tobacco Use    Smoking status: Former     Current packs/day: 0.00     Average packs/day: 1 pack/day for 13.0 years (13.0 ttl pk-yrs)     Types: Cigarettes     Start date: 1995     Quit date: 2008     Years since quittin.9    Smokeless tobacco: Never   Vaping Use    Vaping status: Never Used   Substance and Sexual Activity    Alcohol use: Not Currently    Drug use: Not Currently    Sexual activity: Yes     Partners: Male     Birth control/protection: None     Access to Firearms: denies    Tobacco use counseling/intervention: N/A, patient does not use tobacco; patient was counseled with regard to risks of tobacco use.    PHQ-9 Depression Screening  PHQ-9 Total Score: 4    Little interest or pleasure in doing things? Not at all   Feeling down, depressed, or hopeless? Not at all   PHQ-2 Total Score 0   Trouble falling or staying asleep, or sleeping too much? Not at all   Feeling tired or having little energy? Not at all   Poor appetite or overeating? Not at all   Feeling bad about yourself - or that you are a failure or have let yourself or your family down? Not at all   Trouble concentrating on things, such as reading the newspaper or watching television? Over half   Moving or speaking so slowly that other people could have noticed? Or the opposite - being so fidgety or restless that you have been moving around a lot more than usual? Over half   Thoughts that you would be better off dead, or of hurting yourself in some way? Not at all   PHQ-9 Total Score 4   If you checked off any problems, how difficult have these problems made it for you to do your work, take care of things at home, or get along with other people? Very difficult     GAIL-7  Feeling nervous, anxious or on edge: Nearly every day  Not being able to stop or control worrying: More than half the days  Worrying too much about different things: More than half the  days  Trouble Relaxing: More than half the days  Being so restless that it is hard to sit still: Nearly every day  Feeling afraid as if something awful might happen: Not at all  Becoming easily annoyed or irritable: Several days  GAIL 7 Total Score: 13  If you checked any problems, how difficult have these problems made it for you to do your work, take care of things at home, or get along with other people: Somewhat difficult    ASRS-v1.1  Part A  5/6  Part B  8/12    Total  13/18    Problem List:  Patient Active Problem List   Diagnosis    Allergic rhinitis    Elevated blood pressure reading    Colon cancer screening    Attention deficit hyperactivity disorder (ADHD)    Situational anxiety     Allergy:   Allergies   Allergen Reactions    Sulfacetamide Sodium Anaphylaxis    Sulfa Antibiotics Unknown - High Severity     acute kidney failure    Wellbutrin [Bupropion] Other (See Comments)     Seizure like Sx (muscle twitching all over)       Discontinued Medications:  There are no discontinued medications.    Current Medications:   Current Outpatient Medications   Medication Sig Dispense Refill    atomoxetine (Strattera) 40 MG capsule Take 1 capsule by mouth Every Morning. 30 capsule 1    cloNIDine (Catapres) 0.1 MG tablet Take 1 tablet by mouth 2 (Two) Times a Day As Needed for High Blood Pressure (take only if /100 or higher). 90 tablet 0    levocetirizine (XYZAL) 5 MG tablet Take 1 tablet by mouth Daily. 90 tablet 3    loratadine-pseudoephedrine (Claritin-D 12 Hour) 5-120 MG per 12 hr tablet Take 1 tablet by mouth Daily. 90 tablet 3     No current facility-administered medications for this visit.     Past Medical History:  Past Medical History:   Diagnosis Date    ADHD (attention deficit hyperactivity disorder)     Anxiety      Past Surgical History:  Past Surgical History:   Procedure Laterality Date    BILATERAL INSERTION OF EAR TUBES AND ADENOIDECTOMY      COLONOSCOPY N/A 08/28/2023    Procedure: COLONOSCOPY  "FOR SCREENING;  Surgeon: Kaina Foster MD;  Location: [facility] END[facility]COPY;  Service: Gastroenterology;  Laterality: N/A;  hemorrhoids    FACIAL RECONSTRUCTION SURGERY Right     FRACTURE SURGERY  1997    RHINOPLASTY       Family History:   Family History   Problem Relation Age of Onset    Cancer Father     Prostate cancer Paternal Grandfather     Lung cancer Paternal Uncle     Breast cancer Neg Hx     Ovarian cancer Neg Hx     Uterine cancer Neg Hx     Melanoma Neg Hx     Colon cancer Neg Hx      Mental Status Exam:   Appearance: well-groomed, sits upright, age-appropriate, normal habitus  Behavior: amicable, energetic, cooperative, appropriate in demeanor, appropriate eye-contact  Mood/affect: euthymic / mood-congruent and appropriate in both range and amplitude  Speech: loquacious, otherwise within expected variance; appropriate rate, appropriate rhythm, appropriate tone; non-pressured  Thought Process: linear, goal-directed; no FOI or FEDERICO; abstraction intact  Thought Content: coherent, devoid of overt delusions/perceptual disturbances  SI/HI: denies both SI and HI; exhibits future-orientation, self-advocates appropriately, no regular self-harm, no appreciable intent  Memory: no overt deficits  Orientation: oriented to person/place/time/situation  Concentration: mildly distractible  Intellectual capacity: presumptively average  Insight: good by given history/exam  Judgment: appropriate by given history/exam  Psychomotor: no appreciable latency/retardation/agitation/tremor  Gait: WNL    Review of Systems:  Review of Systems     Vital Signs:   /94   Pulse 92   Ht 165.1 cm (65\")   Wt 68.9 kg (152 lb)   SpO2 100%   BMI 25.29 kg/m²      Lab Results:   Lab on 11/01/2024   Component Date Value Ref Range Status    Glucose 11/01/2024 89  65 - 99 mg/dL Final    BUN 11/01/2024 9  6 - 20 mg/dL Final    Creatinine 11/01/2024 0.78  0.57 - 1.00 mg/dL Final    Sodium 11/01/2024 137  136 - 145 mmol/L Final    " Potassium 11/01/2024 3.8  3.5 - 5.2 mmol/L Final    Chloride 11/01/2024 100  98 - 107 mmol/L Final    CO2 11/01/2024 25.3  22.0 - 29.0 mmol/L Final    Calcium 11/01/2024 9.3  8.6 - 10.5 mg/dL Final    Total Protein 11/01/2024 7.4  6.0 - 8.5 g/dL Final    Albumin 11/01/2024 4.5  3.5 - 5.2 g/dL Final    ALT (SGPT) 11/01/2024 46 (H)  1 - 33 U/L Final    AST (SGOT) 11/01/2024 54 (H)  1 - 32 U/L Final    Alkaline Phosphatase 11/01/2024 69  39 - 117 U/L Final    Total Bilirubin 11/01/2024 0.3  0.0 - 1.2 mg/dL Final    Globulin 11/01/2024 2.9  gm/dL Final    A/G Ratio 11/01/2024 1.6  g/dL Final    BUN/Creatinine Ratio 11/01/2024 11.5  7.0 - 25.0 Final    Anion Gap 11/01/2024 11.7  5.0 - 15.0 mmol/L Final    eGFR 11/01/2024 95.0  >60.0 mL/min/1.73 Final    Total Cholesterol 11/01/2024 240 (H)  0 - 200 mg/dL Final    Triglycerides 11/01/2024 71  0 - 150 mg/dL Final    HDL Cholesterol 11/01/2024 100 (H)  40 - 60 mg/dL Final    LDL Cholesterol  11/01/2024 128 (H)  0 - 100 mg/dL Final    VLDL Cholesterol 11/01/2024 12  5 - 40 mg/dL Final    LDL/HDL Ratio 11/01/2024 1.26   Final    TSH 11/01/2024 2.140  0.270 - 4.200 uIU/mL Final    Hepatitis C Ab 11/01/2024 Non-Reactive  Non-Reactive Final    WBC 11/01/2024 5.44  3.40 - 10.80 10*3/mm3 Final    RBC 11/01/2024 5.09  3.77 - 5.28 10*6/mm3 Final    Hemoglobin 11/01/2024 12.3  12.0 - 15.9 g/dL Final    Hematocrit 11/01/2024 39.7  34.0 - 46.6 % Final    MCV 11/01/2024 78.0 (L)  79.0 - 97.0 fL Final    MCH 11/01/2024 24.2 (L)  26.6 - 33.0 pg Final    MCHC 11/01/2024 31.0 (L)  31.5 - 35.7 g/dL Final    RDW 11/01/2024 14.5  12.3 - 15.4 % Final    RDW-SD 11/01/2024 41.9  37.0 - 54.0 fl Final    MPV 11/01/2024 11.9  6.0 - 12.0 fL Final    Platelets 11/01/2024 315  140 - 450 10*3/mm3 Final    Neutrophil % 11/01/2024 58.5  42.7 - 76.0 % Final    Lymphocyte % 11/01/2024 29.4  19.6 - 45.3 % Final    Monocyte % 11/01/2024 9.0  5.0 - 12.0 % Final    Eosinophil % 11/01/2024 2.4  0.3 - 6.2 % Final     Basophil % 11/01/2024 0.7  0.0 - 1.5 % Final    Immature Grans % 11/01/2024 0.0  0.0 - 0.5 % Final    Neutrophils, Absolute 11/01/2024 3.18  1.70 - 7.00 10*3/mm3 Final    Lymphocytes, Absolute 11/01/2024 1.60  0.70 - 3.10 10*3/mm3 Final    Monocytes, Absolute 11/01/2024 0.49  0.10 - 0.90 10*3/mm3 Final    Eosinophils, Absolute 11/01/2024 0.13  0.00 - 0.40 10*3/mm3 Final    Basophils, Absolute 11/01/2024 0.04  0.00 - 0.20 10*3/mm3 Final    Immature Grans, Absolute 11/01/2024 0.00  0.00 - 0.05 10*3/mm3 Final   Office Visit on 09/18/2024   Component Date Value Ref Range Status    Diagnosis 09/18/2024 Comment   Final    NEGATIVE FOR INTRAEPITHELIAL LESION OR MALIGNANCY.  CELLULAR CHANGES ASSOCIATED WITH INFLAMMATION ARE PRESENT.  THIS SPECIMEN WAS RESCREENED AS PART OF OUR  PROGRAM.    Specimen adequacy: 09/18/2024 Comment   Final    Satisfactory for evaluation.  Endocervical and/or squamous metaplastic  cells (endocervical component) are present.    Clinician Provided ICD-10: 09/18/2024 Comment   Final    Z01.419    Performed by: 09/18/2024 Comment   Final    Sreedhar Arcos, Cytotechnologist (ASC)    QC reviewed by: 09/18/2024 Comment   Final    Pilar Faria, Supervisory Cytotechnologist (ASCP)    . 09/18/2024 .   Final    Note: 09/18/2024 Comment   Final    The Pap smear is a screening test designed to aid in the detection of  premalignant and malignant conditions of the uterine cervix.  It is not a  diagnostic procedure and should not be used as the sole means of detecting  cervical cancer.  Both false-positive and false-negative reports do occur.    Method: 09/18/2024 Comment   Final    This liquid based ThinPrep(R) pap test was screened with the  use of an image guided system.    Conv .conv 09/18/2024 Comment   Final    The HPV DNA reflex criteria were not met with this specimen result  therefore, no HPV testing was performed.     EKG Results:  No orders to display     Imaging Results:  Mammo  Screening Digital Tomosynthesis Bilateral With CAD  Result Date: 9/24/2024  No mammographic evidence of malignancy.  Recommend annual screening mammography.  BI-RADS ASSESSMENT: Category 1: Negative  Note:  It has been reported that there is approximately a 15% false negative rate in mammography.  Therefore, management of a palpable abnormality should not be deferred because of a negative mammogram.  Electronically Signed By-Willis Skelton MD     ASSESSMENT AND PLAN:    ICD-10-CM ICD-9-CM   1. Attention deficit hyperactivity disorder (ADHD), unspecified ADHD type  F90.9 314.01   2. Situational anxiety  F41.8 300.09   3. Elevated blood pressure reading  R03.0 796.2     44 y.o. female who presents today for initial evaluation regarding situational anxiety, concentration deficits. We have discussed the history and interpreted diagnoses as above as well as the treatment plan below, including potential R/B/SE of the recommended regimen of which the patient demonstrates understanding. Patient is agreeable to call 911 or go to the nearest ER should she become concerned for her own safety and/or the safety of those around her. There are no overt indices of acute greg/psychosis on evaluation today.     Medication regimen: begin atomoxetine 40 mg QAM; patient is advised not to misuse prescribed medications or to use any exogenous substances that aren't disclosed to this provider as they may interact with the regimen to her detriment.   Risk assessment: protracted risk is low, imminent risk is low.  Risk factors include: anxiety disorder and recent/ongoing psychosocial stressors. Protective factors include: no known family history of suicidality, intact reality testing, no substance use disorder, no access to firearms, no present SI, no stated history of suicide attempts or self-harm, patient's exhibited future-orientation, strong social support, and patient's cooperation with care. Do note that this is subject to change  with the [facility] of new stressors, treatment non-adherence, use of substances, and/or new medical ails.  Monitoring: reviewed labs/imaging as populated above; PHQ-9 today is 4/27, GAIL-7 today is 13/21, ASRS today 13/18.  Therapy: defer  Follow-up: 6-8 weeks  Communications: N/A  Treatment plan: 12/10/24    TREATMENT PLAN/GOALS: challenge patterns of living conducive to symptom burden, implement recommended regimen as above with augmentative, intermittent supportive psychotherapy to reduce symptom burden. Patient acknowledged and verbally consented to begin treatment as above. The importance of adherence to the recommended treatment and interval follow-up appointments was emphasized today. Patient was today advised to limit daily caffeine intake, hydrate appropriately, eat healthy and nutritious foods, engage sleep hygiene measures, engage appropriate exposure to sunlight, engage with hobbies in balance with life necessities, and exercise appropriate to their capacity to do so.     Billing: I have seen the patient today and considered her psychiatric complaints, rendered a diagnosis, and discussed treatment with the patient as above with which she consents.    Parts of this note are electronic transcriptions/translations of spoken language to printed text using the Dragon Dictation system.    Electronically signed by Alonzo Crain MD, 12/10/24, 7100

## 2024-12-10 ENCOUNTER — OFFICE VISIT (OUTPATIENT)
Dept: PSYCHIATRY | Facility: CLINIC | Age: 44
End: 2024-12-10
Payer: COMMERCIAL

## 2024-12-10 VITALS
HEIGHT: 65 IN | DIASTOLIC BLOOD PRESSURE: 94 MMHG | OXYGEN SATURATION: 100 % | HEART RATE: 92 BPM | WEIGHT: 152 LBS | BODY MASS INDEX: 25.33 KG/M2 | SYSTOLIC BLOOD PRESSURE: 165 MMHG

## 2024-12-10 DIAGNOSIS — F41.8 SITUATIONAL ANXIETY: ICD-10-CM

## 2024-12-10 DIAGNOSIS — F90.9 ATTENTION DEFICIT HYPERACTIVITY DISORDER (ADHD), UNSPECIFIED ADHD TYPE: Primary | ICD-10-CM

## 2024-12-10 DIAGNOSIS — R03.0 ELEVATED BLOOD PRESSURE READING: ICD-10-CM

## 2024-12-10 RX ORDER — ATOMOXETINE 40 MG/1
40 CAPSULE ORAL EVERY MORNING
Qty: 30 CAPSULE | Refills: 1 | Status: SHIPPED | OUTPATIENT
Start: 2024-12-10

## 2024-12-10 NOTE — TREATMENT PLAN
"Multi-Disciplinary Problems (from Behavioral Health Treatment Plan)      Active Problems       Problem:  Patient Care Overview (Adult)  Start Date: 12/10/24      Problem Details: Treatment Planning for Montserrat \"Hue\"    Applicable diagnoses/problems:    - ADHD: practice behaviors that are conducive to creating functional routines (set a medication schedule, a sleep schedule, an activity schedule, limits on spending and other potential items of impulsivity); work towards optimizing a balance of utilizing compensatory mechanisms (including medications), accepting aid from applicable social communities/family, and validation of variable attention-stimulus traits in order to optimize daily functioning.  - progress: N/A; initial visit  - frequency of intervention: as needed  - duration of treatment: until optimized functional status is met    - Anxiety: enhance engagement with regard to ego-syntonic items of living via routine building and disruption of inhibitory executive cognitive circuits; challenge avoidance of ego-syntonic items of living via disruption to perceived barriers; reduce salience of fears and overwhelm with regard to their effects on thinking and behavior.  - progress: N/A; initial visit  - frequency of intervention: as needed  - duration of treatment: until optimized functional status is met        Goal Priority Start Date Expected End Date End Date    Plan of Care Review -- 12/10/24 06/10/25 --                            "

## 2024-12-20 ENCOUNTER — PATIENT ROUNDING (BHMG ONLY) (OUTPATIENT)
Dept: PSYCHIATRY | Facility: CLINIC | Age: 44
End: 2024-12-20
Payer: COMMERCIAL

## 2024-12-20 NOTE — PROGRESS NOTES
December 20, 2024    Hello, may I speak with Montserrat De La Rosa?    My name is DIANE DENTON      I am  with [facility]  [facility]  [address] [facility]  [place] [address] [phone].    Before we get started may I verify your date of birth? 1980    I am calling to officially welcome you to our practice and ask about your recent visit. Is this a good time to talk? NO-VOICEMAIL LEFT    Tell me about your visit with us. What things went well?  NO-VOICEMAIL LEFT     We're always looking for ways to make our patients' experiences even better. Do you have recommendations on ways we may improve?  NO-VOICEMAIL LEFT    Overall were you satisfied with your first visit to our practice? NO-VOICEMAIL LEFT     I appreciate you taking the time to speak with me today. Is there anything else I can do for you? NO-VOICEMAIL LEFT    Thank you, and have a great day.

## 2024-12-26 ENCOUNTER — TELEPHONE (OUTPATIENT)
Dept: FAMILY MEDICINE CLINIC | Age: 44
End: 2024-12-26
Payer: COMMERCIAL

## 2025-01-23 ENCOUNTER — LAB (OUTPATIENT)
Dept: LAB | Facility: HOSPITAL | Age: 45
End: 2025-01-23
Payer: COMMERCIAL

## 2025-01-23 DIAGNOSIS — R74.8 ELEVATED LIVER ENZYMES: ICD-10-CM

## 2025-01-23 LAB
ALBUMIN SERPL-MCNC: 4.4 G/DL (ref 3.5–5.2)
ALBUMIN/GLOB SERPL: 1.6 G/DL
ALP SERPL-CCNC: 59 U/L (ref 39–117)
ALT SERPL W P-5'-P-CCNC: 13 U/L (ref 1–33)
ANION GAP SERPL CALCULATED.3IONS-SCNC: 13.7 MMOL/L (ref 5–15)
AST SERPL-CCNC: 17 U/L (ref 1–32)
BILIRUB SERPL-MCNC: 0.3 MG/DL (ref 0–1.2)
BUN SERPL-MCNC: 9 MG/DL (ref 6–20)
BUN/CREAT SERPL: 11.5 (ref 7–25)
CALCIUM SPEC-SCNC: 9.6 MG/DL (ref 8.6–10.5)
CHLORIDE SERPL-SCNC: 98 MMOL/L (ref 98–107)
CO2 SERPL-SCNC: 24.3 MMOL/L (ref 22–29)
CREAT SERPL-MCNC: 0.78 MG/DL (ref 0.57–1)
EGFRCR SERPLBLD CKD-EPI 2021: 95 ML/MIN/1.73
GLOBULIN UR ELPH-MCNC: 2.7 GM/DL
GLUCOSE SERPL-MCNC: 101 MG/DL (ref 65–99)
POTASSIUM SERPL-SCNC: 3.6 MMOL/L (ref 3.5–5.2)
PROT SERPL-MCNC: 7.1 G/DL (ref 6–8.5)
SODIUM SERPL-SCNC: 136 MMOL/L (ref 136–145)

## 2025-01-23 PROCEDURE — 36415 COLL VENOUS BLD VENIPUNCTURE: CPT

## 2025-01-23 PROCEDURE — 80053 COMPREHEN METABOLIC PANEL: CPT

## 2025-01-28 NOTE — PROGRESS NOTES
"[facility]  Follow-up Visit    Chief Complaint: \"My blood pressure in certain situations started to spike... I went to my PCP thinking I needed a BP medication... but she said it's very much the opposite... situational... my blood pressure isn't usually high.\"     History of Present Illness: Montserrat De La Rosa is a 44 y.o. female who presents today for follow-up. Last seen by this practice: 12/10 at which time atomoxetine was started.     Current treatment regimen includes:   - atomoxetine 40 mg QAM    Hue presents on time and unaccompanied in no acute distress and engages with me appropriately. Today she reports she's seen both benefit and drawbacks with atomoxetine - it has helped some with symptom burden, but duration of effect has been insufficient thus far. There are also some SE. ADHD symptoms are partially manageable with current interventions. Anxious symptoms are better managed with current interventions.   - sleep: no changes  - appetite: no changes; -3 lb since last visit  - medication adverse effects: mild sedation, sexual SE    Interval History and Clinical Commentary:   Ego-syntonic. Hue presents in a fashion consistent with the spectrum of prior assessments with regard to MSE.    She's been thinking retrospectively since meeting with me last, feels that ADHD and symptoms thereof have had significant and enduring effects in her life that she feels have worked to limit her potential.    She's been remembering her dreams more frequently whereas she typically does not.    Changes in political landscape in recent time have been distressing.    Axis I: ADHD, anxiety (situational)  Axis II: defer  Axis III: defer  Axis IV: defer  Axis V: 75    Differential considerations: N/A    Adherence:  Treatment adherence is appropriate; issues in this regard have included: N/A. Patient is advised not to misuse prescribed medications or to use them with any exogenous substances " that aren't disclosed to this provider as they may interact with the regimen to the patient's detriment. The importance of adherence to the recommended treatment and interval follow-up appointments has been emphasized.     Education:  I have counseled Hue with regard to diagnoses and the recommended treatment regimen as documented below. I have advised that because medications can elicit idiosyncratic reactions in different individuals that SE may present in ways that haven't been discussed. I have reiterated the importance of discussing with me any clinical changes that could represent potential adverse effects regarding the medication regimen.    Patient acknowledges the diagnoses per my rendered interpretation. Patient is agreeable to call 911 or go to the nearest ER should she become concerned for her own safety and/or the safety of those around her. Patient demonstrates understanding of potential risks/benefits/side effects associated with the recommended treatment regimen and is amenable to proceed in the fashion outlined below. Patient has been encouraged to cultivate constructive patterns of living including limiting daily caffeine intake, hydrating appropriately, regularly eating nutritious foods, engaging sleep hygiene practices, engaging in appropriate exposure to sunlight, engaging with hobbies in balance with life necessities, and exercising appropriate to their capacity to do so.    Risk:  There is no significant change to risk profile discernible during today's evaluation - do note that this is subject to change with the Buddhist of new stressors, treatment non-adherence, use of substances, and/or new medical ails. There is no appreciable evidence of intent for harm to self or others. There are no appreciable indices of greg/psychosis.    Contraception and mitigation of potential teratogenicity: not currently using contraception. I have advised there are risks taking any medication during pregnancy  and, unfortunately, these risks are poorly elaborated due to ethical concerns with studying medications in pregnant women. I have advised that in the case of pregnancy risk may be beyond what I'm able to advise and the general approach is that medication use should only be considered if potential benefits outweigh the possibility of risks by the patient's measure.    Psychotherapy:  - Time: 20 minutes  - interventions employed: the therapeutic alliance was strengthened to encourage the patient to express their thoughts and feelings freely. Esteem building was enhanced through praise, reassurance, normalizing/challenging, and encouragement as appropriate. Coping skills were enhanced to build distress tolerance skills and emotional regulation. Allowed patient to freely discuss issues without interruption or judgement with unconditional positive regard, active listening skills, and empathy. Provided a safe, confidential environment to facilitate the development of a positive therapeutic relationship and encourage open, honest communication. Assisted patient in processing session content; acknowledged and normalized/addressed, as appropriate, patient’s thoughts, feelings, and concerns by utilizing a person-centered approach in efforts to build appropriate rapport and a positive therapeutic relationship.   - Diagnoses: see assessment and plan below  - Symptoms: see subjective above  - Goals   - patient: cultivate focus/concentration, mitigate anxiety and its impact on thinking and emotions, bolster functional capacity   - provider: challenge patterns of living conducive to pathology, strengthen defenses, promote problems solving, restore adaptive functioning and provide symptom relief.  - Treatment plan: continue supportive psychotherapy in subsequent appointments to provide symptom relief; see assessment and plan below for additional details:   - iteration: 1   - progress: partial   - (X)illumination,  "(X)contextualization, (working)detection, (working)development, (-)elaboration, (-)refinement  - functional status: good  - mental status exam: as below  - prognosis: good  - assignment(s): N/A    Psychiatric History:  Diagnoses: ADHD (diagnosed in elementary school), social anxiety  Outpatient history: denies  Inpatient history: denies  Medication trials: sertraline (affective dulling, weight gain), clonidine (overly sedating)  Other treatment modalities: denies therapy hx  Presenting regimen: N/A  Self harm: denies  Suicide attempts: denies     Substance Abuse History:   Types/methods/frequency: THC rarely      Social History:  Residence: lives in a house with her  ( 10 years, together 20 years) and their rescued animals  Vocation: MRI technician  Education: radiology certification after HS, one class away from associate's degree; [employer]  Pertinent developmental history: denies abuse hx  Pertinent legal history: denies  Hobbies/interests: traveling, reading, yoga, cooking  Jain: \"spiritual... I was raised Presbyterian\"   Exercise: works out regularly  Dietary habits: defer  Sleep hygiene: defer  Social habits: no pertinent issues  Sunlight: no concern for under-exposure  Caffeine intake: no pertinent issues; 1 glass of sweet tea daily  Hydration habits: no pertinent issues   history: denies    Social History     Socioeconomic History    Marital status:    Tobacco Use    Smoking status: Former     Current packs/day: 0.00     Average packs/day: 1 pack/day for 13.0 years (13.0 ttl pk-yrs)     Types: Cigarettes     Start date: 1995     Quit date: 2008     Years since quittin.0    Smokeless tobacco: Never   Vaping Use    Vaping status: Never Used   Substance and Sexual Activity    Alcohol use: Not Currently    Drug use: Not Currently    Sexual activity: Yes     Partners: Male     Birth control/protection: None     Tobacco use counseling/intervention: N/A, " patient does not use tobacco; patient has been counseled with regard to risks of tobacco use.    PHQ-9 Depression Screening  PHQ-9 Total Score:       Little interest or pleasure in doing things?     Feeling down, depressed, or hopeless?     PHQ-2 Total Score     Trouble falling or staying asleep, or sleeping too much?     Feeling tired or having little energy?     Poor appetite or overeating?     Feeling bad about yourself - or that you are a failure or have let yourself or your family down?     Trouble concentrating on things, such as reading the newspaper or watching television?     Moving or speaking so slowly that other people could have noticed? Or the opposite - being so fidgety or restless that you have been moving around a lot more than usual?     Thoughts that you would be better off dead, or of hurting yourself in some way?     PHQ-9 Total Score     If you checked off any problems, how difficult have these problems made it for you to do your work, take care of things at home, or get along with other people?       Change in PHQ-9 since last measure: N/A (4)    GAIL-7       Change in GAIL-7 since last measure: N/A (13)    Problem List:  Patient Active Problem List   Diagnosis    Allergic rhinitis    Elevated blood pressure reading    Colon cancer screening    Attention deficit hyperactivity disorder (ADHD)    Situational anxiety     Allergy:   Allergies   Allergen Reactions    Sulfacetamide Sodium Anaphylaxis    Sulfa Antibiotics Unknown - High Severity     acute kidney failure    Wellbutrin [Bupropion] Other (See Comments)     Seizure like Sx (muscle twitching all over)       Discontinued Medications:  Medications Discontinued During This Encounter   Medication Reason    cloNIDine (Catapres) 0.1 MG tablet *Therapy completed     Current Medications:   Current Outpatient Medications   Medication Sig Dispense Refill    atomoxetine (Strattera) 40 MG capsule Take 1 capsule by mouth Every Morning. 30 capsule 1     levocetirizine (XYZAL) 5 MG tablet Take 1 tablet by mouth Daily. 90 tablet 3    loratadine-pseudoephedrine (Claritin-D 12 Hour) 5-120 MG per 12 hr tablet Take 1 tablet by mouth Daily. 90 tablet 3     No current facility-administered medications for this visit.     Past Medical History:  Past Medical History:   Diagnosis Date    ADHD (attention deficit hyperactivity disorder)     Anxiety      Past Surgical History:  Past Surgical History:   Procedure Laterality Date    BILATERAL INSERTION OF EAR TUBES AND ADENOIDECTOMY      COLONOSCOPY N/A 08/28/2023    Procedure: COLONOSCOPY FOR SCREENING;  Surgeon: Kiana Foster MD;  Location: [facility];  Service: Gastroenterology;  Laterality: N/A;  hemorrhoids    FACIAL RECONSTRUCTION SURGERY Right     FRACTURE SURGERY  1997    RHINOPLASTY       Mental Status Exam:   Appearance: well-groomed, sits upright, age-appropriate, average habitus  Behavior: amicable, energetic, cooperative, appropriate in demeanor, appropriate eye-contact  Mood/affect: euthymic / mood-congruent and appropriate in both range and amplitude  Speech: loquacious, otherwise within expected variance; appropriate rate, appropriate rhythm, appropriate tone; non-pressured  Thought Process: linear, goal-directed; no FOI or FEDERICO; abstraction intact  Thought Content: coherent, devoid of overt delusions/perceptual disturbances  SI/HI: denies both SI and HI; exhibits future-orientation, self-advocates appropriately, no regular self-harm, no appreciable intent  Memory: no overt deficits  Orientation: oriented to person/place/time/situation  Concentration: mildly distractible  Intellectual capacity: presumptively average  Insight: good by given history/exam  Judgment: appropriate by given history/exam  Psychomotor: no appreciable latency/retardation/agitation/tremor  Gait: WNL    Review of Systems:  Review of Systems   Constitutional:  Negative for chills, diaphoresis, fatigue and fever.   HENT:  Negative  "for congestion and sore throat.    Respiratory:  Negative for cough.    Cardiovascular:  Negative for chest pain.   Gastrointestinal:  Negative for abdominal pain, nausea and vomiting.   Genitourinary:  Negative for dysuria.   Musculoskeletal:  Negative for myalgias and neck pain.   Skin:  Negative for rash.   Neurological:  Negative for weakness, numbness and headaches.     Vital Signs:   /87   Pulse 96   Ht 165.1 cm (65\")   Wt 67.7 kg (149 lb 3.2 oz)   BMI 24.83 kg/m²      Lab Results:   Lab on 01/23/2025   Component Date Value Ref Range Status    Glucose 01/23/2025 101 (H)  65 - 99 mg/dL Final    BUN 01/23/2025 9  6 - 20 mg/dL Final    Creatinine 01/23/2025 0.78  0.57 - 1.00 mg/dL Final    Sodium 01/23/2025 136  136 - 145 mmol/L Final    Potassium 01/23/2025 3.6  3.5 - 5.2 mmol/L Final    Chloride 01/23/2025 98  98 - 107 mmol/L Final    CO2 01/23/2025 24.3  22.0 - 29.0 mmol/L Final    Calcium 01/23/2025 9.6  8.6 - 10.5 mg/dL Final    Total Protein 01/23/2025 7.1  6.0 - 8.5 g/dL Final    Albumin 01/23/2025 4.4  3.5 - 5.2 g/dL Final    ALT (SGPT) 01/23/2025 13  1 - 33 U/L Final    AST (SGOT) 01/23/2025 17  1 - 32 U/L Final    Alkaline Phosphatase 01/23/2025 59  39 - 117 U/L Final    Total Bilirubin 01/23/2025 0.3  0.0 - 1.2 mg/dL Final    Globulin 01/23/2025 2.7  gm/dL Final    A/G Ratio 01/23/2025 1.6  g/dL Final    BUN/Creatinine Ratio 01/23/2025 11.5  7.0 - 25.0 Final    Anion Gap 01/23/2025 13.7  5.0 - 15.0 mmol/L Final    eGFR 01/23/2025 95.0  >60.0 mL/min/1.73 Final   Lab on 11/01/2024   Component Date Value Ref Range Status    Glucose 11/01/2024 89  65 - 99 mg/dL Final    BUN 11/01/2024 9  6 - 20 mg/dL Final    Creatinine 11/01/2024 0.78  0.57 - 1.00 mg/dL Final    Sodium 11/01/2024 137  136 - 145 mmol/L Final    Potassium 11/01/2024 3.8  3.5 - 5.2 mmol/L Final    Chloride 11/01/2024 100  98 - 107 mmol/L Final    CO2 11/01/2024 25.3  22.0 - 29.0 mmol/L Final    Calcium 11/01/2024 9.3  8.6 - 10.5 " mg/dL Final    Total Protein 11/01/2024 7.4  6.0 - 8.5 g/dL Final    Albumin 11/01/2024 4.5  3.5 - 5.2 g/dL Final    ALT (SGPT) 11/01/2024 46 (H)  1 - 33 U/L Final    AST (SGOT) 11/01/2024 54 (H)  1 - 32 U/L Final    Alkaline Phosphatase 11/01/2024 69  39 - 117 U/L Final    Total Bilirubin 11/01/2024 0.3  0.0 - 1.2 mg/dL Final    Globulin 11/01/2024 2.9  gm/dL Final    A/G Ratio 11/01/2024 1.6  g/dL Final    BUN/Creatinine Ratio 11/01/2024 11.5  7.0 - 25.0 Final    Anion Gap 11/01/2024 11.7  5.0 - 15.0 mmol/L Final    eGFR 11/01/2024 95.0  >60.0 mL/min/1.73 Final    Total Cholesterol 11/01/2024 240 (H)  0 - 200 mg/dL Final    Triglycerides 11/01/2024 71  0 - 150 mg/dL Final    HDL Cholesterol 11/01/2024 100 (H)  40 - 60 mg/dL Final    LDL Cholesterol  11/01/2024 128 (H)  0 - 100 mg/dL Final    VLDL Cholesterol 11/01/2024 12  5 - 40 mg/dL Final    LDL/HDL Ratio 11/01/2024 1.26   Final    TSH 11/01/2024 2.140  0.270 - 4.200 uIU/mL Final    Hepatitis C Ab 11/01/2024 Non-Reactive  Non-Reactive Final    WBC 11/01/2024 5.44  3.40 - 10.80 10*3/mm3 Final    RBC 11/01/2024 5.09  3.77 - 5.28 10*6/mm3 Final    Hemoglobin 11/01/2024 12.3  12.0 - 15.9 g/dL Final    Hematocrit 11/01/2024 39.7  34.0 - 46.6 % Final    MCV 11/01/2024 78.0 (L)  79.0 - 97.0 fL Final    MCH 11/01/2024 24.2 (L)  26.6 - 33.0 pg Final    MCHC 11/01/2024 31.0 (L)  31.5 - 35.7 g/dL Final    RDW 11/01/2024 14.5  12.3 - 15.4 % Final    RDW-SD 11/01/2024 41.9  37.0 - 54.0 fl Final    MPV 11/01/2024 11.9  6.0 - 12.0 fL Final    Platelets 11/01/2024 315  140 - 450 10*3/mm3 Final    Neutrophil % 11/01/2024 58.5  42.7 - 76.0 % Final    Lymphocyte % 11/01/2024 29.4  19.6 - 45.3 % Final    Monocyte % 11/01/2024 9.0  5.0 - 12.0 % Final    Eosinophil % 11/01/2024 2.4  0.3 - 6.2 % Final    Basophil % 11/01/2024 0.7  0.0 - 1.5 % Final    Immature Grans % 11/01/2024 0.0  0.0 - 0.5 % Final    Neutrophils, Absolute 11/01/2024 3.18  1.70 - 7.00 10*3/mm3 Final     Lymphocytes, Absolute 11/01/2024 1.60  0.70 - 3.10 10*3/mm3 Final    Monocytes, Absolute 11/01/2024 0.49  0.10 - 0.90 10*3/mm3 Final    Eosinophils, Absolute 11/01/2024 0.13  0.00 - 0.40 10*3/mm3 Final    Basophils, Absolute 11/01/2024 0.04  0.00 - 0.20 10*3/mm3 Final    Immature Grans, Absolute 11/01/2024 0.00  0.00 - 0.05 10*3/mm3 Final   Office Visit on 09/18/2024   Component Date Value Ref Range Status    Diagnosis 09/18/2024 Comment   Final    NEGATIVE FOR INTRAEPITHELIAL LESION OR MALIGNANCY.  CELLULAR CHANGES ASSOCIATED WITH INFLAMMATION ARE PRESENT.  THIS SPECIMEN WAS RESCREENED AS PART OF OUR  PROGRAM.    Specimen adequacy: 09/18/2024 Comment   Final    Satisfactory for evaluation.  Endocervical and/or squamous metaplastic  cells (endocervical component) are present.    Clinician Provided ICD-10: 09/18/2024 Comment   Final    Z01.419    Performed by: 09/18/2024 Comment   Final    Sreedhar Arcos, Cytotechnologist (ASCP)    QC reviewed by: 09/18/2024 Comment   Final    Pilar Faria, Supervisory Cytotechnologist (ASCP)    . 09/18/2024 .   Final    Note: 09/18/2024 Comment   Final    The Pap smear is a screening test designed to aid in the detection of  premalignant and malignant conditions of the uterine cervix.  It is not a  diagnostic procedure and should not be used as the sole means of detecting  cervical cancer.  Both false-positive and false-negative reports do occur.    Method: 09/18/2024 Comment   Final    This liquid based ThinPrep(R) pap test was screened with the  use of an image guided system.    Conv .conv 09/18/2024 Comment   Final    The HPV DNA reflex criteria were not met with this specimen result  therefore, no HPV testing was performed.     EKG Results:  No orders to display     Imaging Results:  No Images in the past 120 days found.    ASSESSMENT AND PLAN:    ICD-10-CM ICD-9-CM   1. Attention deficit hyperactivity disorder (ADHD), unspecified ADHD type  F90.9 314.01   2.  Situational anxiety  F41.8 300.09     44 y.o. female who presents today for follow-up. We have discussed the interval history and the treatment plan below:      Medication regimen: titrate atomoxetine to 40 mg BID   Monitoring: reviewed labs as populated above; ordered  Primary psychotherapy: deferred  Follow-up: 6 weeks  Communications: N/A  Treatment plan: 12/10/2024    TREATMENT PLAN/GOALS: challenge patterns of living conducive to symptom burden, implement recommended regimen as above with augmentative, intermittent supportive psychotherapy to reduce symptom burden. Patient acknowledged and verbally consented to continue treatment.      Billing: This encounter is of moderate complexity based on number/complexity of problems addressed today and risk of complications/morbidity: 2+ stable chronic illnesses and and prescription management. Additionally, I provided 20 minutes of dedicated psychotherapy to the patient, distinct from E/M services, as documented above. Start time: 1410. Stop time: 1430.     Electronically signed by Alonzo Crain MD, 01/29/25, 0395

## 2025-01-29 ENCOUNTER — OFFICE VISIT (OUTPATIENT)
Dept: PSYCHIATRY | Facility: CLINIC | Age: 45
End: 2025-01-29
Payer: COMMERCIAL

## 2025-01-29 VITALS
DIASTOLIC BLOOD PRESSURE: 87 MMHG | BODY MASS INDEX: 24.86 KG/M2 | HEART RATE: 96 BPM | HEIGHT: 65 IN | WEIGHT: 149.2 LBS | SYSTOLIC BLOOD PRESSURE: 132 MMHG

## 2025-01-29 DIAGNOSIS — F41.8 SITUATIONAL ANXIETY: ICD-10-CM

## 2025-01-29 DIAGNOSIS — F90.9 ATTENTION DEFICIT HYPERACTIVITY DISORDER (ADHD), UNSPECIFIED ADHD TYPE: Primary | ICD-10-CM

## 2025-01-29 RX ORDER — ATOMOXETINE 40 MG/1
40 CAPSULE ORAL 2 TIMES DAILY
Qty: 60 CAPSULE | Refills: 1 | Status: SHIPPED | OUTPATIENT
Start: 2025-01-29

## 2025-02-06 ENCOUNTER — TELEPHONE (OUTPATIENT)
Dept: PSYCHIATRY | Facility: CLINIC | Age: 45
End: 2025-02-06
Payer: COMMERCIAL

## 2025-02-06 DIAGNOSIS — Z51.81 THERAPEUTIC DRUG MONITORING: ICD-10-CM

## 2025-02-06 DIAGNOSIS — F90.9 ATTENTION DEFICIT HYPERACTIVITY DISORDER (ADHD), UNSPECIFIED ADHD TYPE: Primary | ICD-10-CM

## 2025-02-06 NOTE — TELEPHONE ENCOUNTER
I'm sorry to hear it's had this effect, it's certainly not the intended one. We can try a different medication. Would she be open to trying Adderall? This is a controlled substance and would require yearly drug screen monitoring and agreement to a controlled substance contract. Potential SE include reduced appetite, increase to HR/BP, irritability, mood changes, dry mouth, headaches, changes in alertness, and possibly insomnia. With continued use it is possible to develop dependence and tolerance on this type of medication.

## 2025-02-06 NOTE — TELEPHONE ENCOUNTER
PT PHONED IN.    SIDE EFFECTS FROM THE STRATTERA.    PT REPORTS THAT AFTER TAKING IT THE 2ND DAY SHE HAS HAD A CONSTANT DAILY HEADACHE; SHE HAS BEEN SOOO TIRED. SHE EVEN TRIED TAKING IT AT NIGHT AND ITS STILL MAKING HER GROGGY DURING THE DAY; DOUBLING UP THE DOSE HAS MADE IT WORSE.    AT THIS POINT SHE THINKS THAT SHE MIGHT WANT TO TRY SOMETHING ELSE. SHE DOESN'T THINK THIS ONE MIGHT BE FOR HER.    SHE STATES THAT ITS NOT DOING ANYTHING FOR HER ATTENTION.   THE ONLY THING THAT IT'S HELPING WITH IS HER ANXIETY.    SHE IS AT WORK SO IF SHE DOESN'T ANSWER SHE OKAYED FOR US TO LEAVE A DETAILED VOICEMAIL.    PROVIDER PLEASE REVIEW.

## 2025-02-07 RX ORDER — DEXTROAMPHETAMINE SACCHARATE, AMPHETAMINE ASPARTATE MONOHYDRATE, DEXTROAMPHETAMINE SULFATE AND AMPHETAMINE SULFATE 2.5; 2.5; 2.5; 2.5 MG/1; MG/1; MG/1; MG/1
10 CAPSULE, EXTENDED RELEASE ORAL EVERY MORNING
Qty: 30 CAPSULE | Refills: 0 | Status: SHIPPED | OUTPATIENT
Start: 2025-02-07 | End: 2025-02-07

## 2025-02-07 RX ORDER — DEXTROAMPHETAMINE SACCHARATE, AMPHETAMINE ASPARTATE MONOHYDRATE, DEXTROAMPHETAMINE SULFATE AND AMPHETAMINE SULFATE 1.25; 1.25; 1.25; 1.25 MG/1; MG/1; MG/1; MG/1
5 CAPSULE, EXTENDED RELEASE ORAL EVERY MORNING
Qty: 30 CAPSULE | Refills: 0 | Status: SHIPPED | OUTPATIENT
Start: 2025-02-07

## 2025-02-07 NOTE — TELEPHONE ENCOUNTER
Spoke with pharmacy staff - 10 mg is out of stock. Will first try 5 mg to assess for effect and tolerance.

## 2025-03-04 ENCOUNTER — TELEPHONE (OUTPATIENT)
Dept: PSYCHIATRY | Facility: CLINIC | Age: 45
End: 2025-03-04
Payer: COMMERCIAL

## 2025-03-04 NOTE — TELEPHONE ENCOUNTER
PT(PATIENT) VERIFIED      CONTACTED PT(PATIENT) VIA OVER DUE LAB ORDERS PROTOCOL     Urine Drug Screen - Urine, Clean Catch (2025 09:35)     PT(PATIENT) ADVISED TO COMPLETE ORDER VIA OUTPATIENT LAB SERVICES AT ANY OF THE FOLLOWING [facility] LOCATIONS     [place] [address] [address] [facility] [address]  MON - FRI 7AM-530PM  SAT 8AM-NOON  PHONE #[phone]  FAX#[phone]    [address]   MON-FRI 830AM-430PM  CLOSED SATURDAY AND    PHONE #[phone]    [facility] OUTPATIENT LAB   OPEN M-F 630AM - 5PM   CLOSED SATURDAY AND    [address] [place] [address]   PHONE #[phone]     [facility] OUTPATIENT LAB   [address] [facility] [address]  OPEN M-F 630AM - 5PM   CLOSED SATURDAY AND    PHONE #[phone]     [facility] OUTPATIENT LAB   [address] [address]  MON - FRI 730AM-4PM  CLOSED SATURDAY AND    PHONE #[phone]    [facility] OUTPATIENT LAB   [address] [facility]  MON - FRI 730AM-4PM  CLOSED SATURDAY AND    PHONE #[phone]    [facility] OUTPATIENT LAB (MOVED TO [facility])  [address]   OPEN M-F 6AM - 6PM, SAT 6AM-12PM  PHONE #[phone] OR EXT [phone]  FAX #[phone]   EXT [id number]     PT(PATIENT) VERBALIZED UNDERSTANDING AND HAD NO FURTHER QUESTIONS AT THIS TIME

## 2025-03-05 ENCOUNTER — LAB (OUTPATIENT)
Dept: LAB | Facility: HOSPITAL | Age: 45
End: 2025-03-05
Payer: COMMERCIAL

## 2025-03-05 LAB
AMPHET+METHAMPHET UR QL: NEGATIVE
AMPHETAMINES UR QL: NEGATIVE
BARBITURATES UR QL SCN: NEGATIVE
BENZODIAZ UR QL SCN: NEGATIVE
BUPRENORPHINE SERPL-MCNC: NEGATIVE NG/ML
CANNABINOIDS SERPL QL: NEGATIVE
COCAINE UR QL: NEGATIVE
FENTANYL UR-MCNC: NEGATIVE NG/ML
METHADONE UR QL SCN: NEGATIVE
OPIATES UR QL: NEGATIVE
OXYCODONE UR QL SCN: NEGATIVE
PCP UR QL SCN: NEGATIVE
TRICYCLICS UR QL SCN: NEGATIVE

## 2025-03-05 PROCEDURE — 80307 DRUG TEST PRSMV CHEM ANLYZR: CPT | Performed by: PSYCHIATRY & NEUROLOGY

## 2025-03-12 ENCOUNTER — OFFICE VISIT (OUTPATIENT)
Dept: PSYCHIATRY | Facility: CLINIC | Age: 45
End: 2025-03-12
Payer: COMMERCIAL

## 2025-03-12 VITALS
BODY MASS INDEX: 25.39 KG/M2 | HEIGHT: 65 IN | HEART RATE: 105 BPM | DIASTOLIC BLOOD PRESSURE: 82 MMHG | SYSTOLIC BLOOD PRESSURE: 138 MMHG | WEIGHT: 152.4 LBS

## 2025-03-12 DIAGNOSIS — F41.8 SITUATIONAL ANXIETY: ICD-10-CM

## 2025-03-12 DIAGNOSIS — F90.9 ATTENTION DEFICIT HYPERACTIVITY DISORDER (ADHD), UNSPECIFIED ADHD TYPE: Primary | ICD-10-CM

## 2025-03-12 RX ORDER — DEXTROAMPHETAMINE SACCHARATE, AMPHETAMINE ASPARTATE, DEXTROAMPHETAMINE SULFATE AND AMPHETAMINE SULFATE 1.25; 1.25; 1.25; 1.25 MG/1; MG/1; MG/1; MG/1
5 TABLET ORAL
Qty: 30 TABLET | Refills: 0 | Status: SHIPPED | OUTPATIENT
Start: 2025-03-12

## 2025-03-12 RX ORDER — DEXTROAMPHETAMINE SACCHARATE, AMPHETAMINE ASPARTATE MONOHYDRATE, DEXTROAMPHETAMINE SULFATE AND AMPHETAMINE SULFATE 1.25; 1.25; 1.25; 1.25 MG/1; MG/1; MG/1; MG/1
5 CAPSULE, EXTENDED RELEASE ORAL EVERY MORNING
Qty: 30 CAPSULE | Refills: 0 | Status: SHIPPED | OUTPATIENT
Start: 2025-04-11

## 2025-03-12 RX ORDER — DEXTROAMPHETAMINE SACCHARATE, AMPHETAMINE ASPARTATE, DEXTROAMPHETAMINE SULFATE AND AMPHETAMINE SULFATE 1.25; 1.25; 1.25; 1.25 MG/1; MG/1; MG/1; MG/1
5 TABLET ORAL
Qty: 30 TABLET | Refills: 0 | Status: SHIPPED | OUTPATIENT
Start: 2025-04-11

## 2025-03-12 RX ORDER — DEXTROAMPHETAMINE SACCHARATE, AMPHETAMINE ASPARTATE MONOHYDRATE, DEXTROAMPHETAMINE SULFATE AND AMPHETAMINE SULFATE 1.25; 1.25; 1.25; 1.25 MG/1; MG/1; MG/1; MG/1
5 CAPSULE, EXTENDED RELEASE ORAL EVERY MORNING
Qty: 30 CAPSULE | Refills: 0 | Status: SHIPPED | OUTPATIENT
Start: 2025-03-12

## 2025-03-12 NOTE — PROGRESS NOTES
"[facility]  Follow-up Visit    Chief Complaint: \"My blood pressure in certain situations started to spike... I went to my PCP thinking I needed a BP medication... but she said it's very much the opposite... situational... my blood pressure isn't usually high.\"     History of Present Illness: Montserrat De La Rosa is a 45 y.o. female who presents today for follow-up. Last seen by this practice: 01/29 at which time atomoxetine was titrated. This has since been transitioned to amphetamine.    Current treatment regimen includes:   - amphetamine 5 mg QAM    Hue presents on time and unaccompanied in no acute distress and engages with me appropriately. Today she reports the change from atomoxetine to amphetamine has been propitious. SE experienced with atomoxetine have abated. ADHD symptoms are partially managed with current interventions; she feels the duration of effect with regard to her medication is insufficient. Anxious symptoms are manageable with current interventions.  - sleep: no changes  - appetite: no changes; +3 lb since last visit  - medication adverse effects: denies    Interval History and Clinical Commentary:   Ego-syntonic. Hue presents in a fashion consistent with the spectrum of prior assessments with regard to MSE.    Interval history reported to be generally unremarkable aside from [place]    Axis I: ADHD, anxiety (situational)  Axis II: defer  Axis III: defer  Axis IV: defer  Axis V: 80    Differential considerations: N/A    Adherence:  Treatment adherence is appropriate; issues in this regard have included: N/A. Patient is advised not to misuse prescribed medications or to use them with any exogenous substances that aren't disclosed to this provider as they may interact with the regimen to the patient's detriment. The importance of adherence to the recommended treatment and interval follow-up appointments has been " emphasized.     Education:  I have counseled Hue with regard to diagnoses and the recommended treatment regimen as documented below. I have advised that because medications can elicit idiosyncratic reactions in different individuals that SE may present in ways that haven't been discussed. I have reiterated the importance of discussing with me any clinical changes that could represent potential adverse effects regarding the medication regimen.    Patient acknowledges the diagnoses per my rendered interpretation. Patient is agreeable to call 911 or go to the nearest ER should she become concerned for her own safety and/or the safety of those around her. Patient demonstrates understanding of potential risks/benefits/side effects associated with the recommended treatment regimen and is amenable to proceed in the fashion outlined below. Patient has been encouraged to cultivate constructive patterns of living including limiting daily caffeine intake, hydrating appropriately, regularly eating nutritious foods, engaging sleep hygiene practices, engaging in appropriate exposure to sunlight, engaging with hobbies in balance with life necessities, and exercising appropriate to their capacity to do so.    Risk:  There is no significant change to risk profile discernible during today's evaluation - do note that this is subject to change with the [employer] of new stressors, treatment non-adherence, use of substances, and/or new medical ails. There is no appreciable evidence of intent for harm to self or others. There are no appreciable indices of greg/psychosis.    Contraception and mitigation of potential teratogenicity: not currently using contraception. I have advised there are risks taking any medication during pregnancy and, unfortunately, these risks are poorly elaborated due to ethical concerns with studying medications in pregnant women. I have advised that in the case of pregnancy risk may be beyond what I'm able to  advise and the general approach is that medication use should only be considered if potential benefits outweigh the possibility of risks by the patient's measure.    Psychotherapy:  - Time: 17 minutes  - interventions employed: the therapeutic alliance was strengthened to encourage the patient to express their thoughts and feelings freely. Esteem building was enhanced through praise, reassurance, normalizing/challenging, and encouragement as appropriate. Coping skills were enhanced to build distress tolerance skills and emotional regulation. Allowed patient to freely discuss issues without interruption or judgement with unconditional positive regard, active listening skills, and empathy. Provided a safe, confidential environment to facilitate the development of a positive therapeutic relationship and encourage open, honest communication. Assisted patient in processing session content; acknowledged and normalized/addressed, as appropriate, patient’s thoughts, feelings, and concerns by utilizing a person-centered approach in efforts to build appropriate rapport and a positive therapeutic relationship.   - Diagnoses: see assessment and plan below  - Symptoms: see subjective above  - Goals   - patient: cultivate focus/concentration, mitigate anxiety and its impact on thinking and emotions, bolster functional capacity   - provider: challenge patterns of living conducive to pathology, strengthen defenses, promote problems solving, restore adaptive functioning and provide symptom relief.  - Treatment plan: continue supportive psychotherapy in subsequent appointments to provide symptom relief; see assessment and plan below for additional details:   - iteration: 1   - progress: partial   - (X)illumination, (X)contextualization, (working)detection, (working)development, (-)elaboration, (-)refinement  - functional status: good  - mental status exam: as below  - prognosis: good  - assignment(s): N/A    Psychiatric  "History:  Diagnoses: ADHD (diagnosed in elementary school), social anxiety  Outpatient history: denies  Inpatient history: denies  Medication trials: sertraline (affective dulling, weight gain), clonidine (overly sedating)  Other treatment modalities: denies therapy hx  Presenting regimen: N/A  Self harm: denies  Suicide attempts: denies     Substance Abuse History:   Types/methods/frequency: THC rarely      Social History:  Residence: lives in a house with her  ( 10 years, together 20 years) and their rescued animals  Vocation: MRI technician  Education: radiology certification after HS, one class away from associate's degree; [employer]  Pertinent developmental history: denies abuse hx  Pertinent legal history: denies  Hobbies/interests: traveling, reading, yoga, cooking  Restoration: \"spiritual... I was raised [employer]\"   Exercise: works out regularly  Dietary habits: defer  Sleep hygiene: defer  Social habits: no pertinent issues  Sunlight: no concern for under-exposure  Caffeine intake: no pertinent issues; 1 glass of sweet tea daily  Hydration habits: no pertinent issues   history: denies    Social History     Socioeconomic History    Marital status:    Tobacco Use    Smoking status: Former     Current packs/day: 0.00     Average packs/day: 1 pack/day for 13.0 years (13.0 ttl pk-yrs)     Types: Cigarettes     Start date: 1995     Quit date: 2008     Years since quittin.1    Smokeless tobacco: Never   Vaping Use    Vaping status: Never Used   Substance and Sexual Activity    Alcohol use: Not Currently    Drug use: Not Currently    Sexual activity: Yes     Partners: Male     Birth control/protection: None     Tobacco use counseling/intervention: N/A, patient does not use tobacco; patient has been counseled with regard to risks of tobacco use.    PHQ-9 Depression Screening  PHQ-9 Total Score:       Little interest or pleasure in doing things?     Feeling down, " depressed, or hopeless?     PHQ-2 Total Score     Trouble falling or staying asleep, or sleeping too much?     Feeling tired or having little energy?     Poor appetite or overeating?     Feeling bad about yourself - or that you are a failure or have let yourself or your family down?     Trouble concentrating on things, such as reading the newspaper or watching television?     Moving or speaking so slowly that other people could have noticed? Or the opposite - being so fidgety or restless that you have been moving around a lot more than usual?     Thoughts that you would be better off dead, or of hurting yourself in some way?     PHQ-9 Total Score     If you checked off any problems, how difficult have these problems made it for you to do your work, take care of things at home, or get along with other people?       Change in PHQ-9 since last measure: N/A (4)    GAIL-7       Change in GAIL-7 since last measure: N/A (13)    Problem List:  Patient Active Problem List   Diagnosis    Allergic rhinitis    Elevated blood pressure reading    Colon cancer screening    Attention deficit hyperactivity disorder (ADHD)    Situational anxiety     Allergy:   Allergies   Allergen Reactions    Sulfacetamide Sodium Anaphylaxis    Sulfa Antibiotics Unknown - High Severity     acute kidney failure    Wellbutrin [Bupropion] Other (See Comments)     Seizure like Sx (muscle twitching all over)       Discontinued Medications:  There are no discontinued medications.    Current Medications:   Current Outpatient Medications   Medication Sig Dispense Refill    amphetamine-dextroamphetamine XR (Adderall XR) 5 MG 24 hr capsule Take 1 capsule by mouth Every Morning 30 capsule 0    atomoxetine (Strattera) 40 MG capsule Take 1 capsule by mouth 2 (Two) Times a Day. 60 capsule 1    levocetirizine (XYZAL) 5 MG tablet Take 1 tablet by mouth Daily. 90 tablet 3    loratadine-pseudoephedrine (Claritin-D 12 Hour) 5-120 MG per 12 hr tablet Take 1 tablet by  mouth Daily. 90 tablet 3     No current facility-administered medications for this visit.     Past Medical History:  Past Medical History:   Diagnosis Date    ADHD (attention deficit hyperactivity disorder)     Anxiety      Past Surgical History:  Past Surgical History:   Procedure Laterality Date    BILATERAL INSERTION OF EAR TUBES AND ADENOIDECTOMY      COLONOSCOPY N/A 08/28/2023    Procedure: COLONOSCOPY FOR SCREENING;  Surgeon: Kiana Foster MD;  Location: [facility] ENDOSCOPY;  Service: Gastroenterology;  Laterality: N/A;  hemorrhoids    FACIAL RECONSTRUCTION SURGERY Right     FRACTURE SURGERY  1997    RHINOPLASTY       Mental Status Exam:   Appearance: well-groomed, sits upright, age-appropriate, average habitus  Behavior: amicable, energetic, cooperative, appropriate in demeanor, appropriate eye-contact  Mood/affect: euthymic / mood-congruent and appropriate in both range and amplitude  Speech: loquacious, otherwise within expected variance; appropriate rate, appropriate rhythm, appropriate tone; non-pressured  Thought Process: linear, goal-directed; no FOI or FEDERICO; abstraction intact  Thought Content: coherent, devoid of overt delusions/perceptual disturbances  SI/HI: denies both SI and HI; exhibits future-orientation, self-advocates appropriately, no regular self-harm, no appreciable intent  Memory: no overt deficits  Orientation: oriented to person/place/time/situation  Concentration: mildly distractible  Intellectual capacity: presumptively average  Insight: good by given history/exam  Judgment: appropriate by given history/exam  Psychomotor: no appreciable latency/retardation/agitation/tremor  Gait: WNL    Review of Systems:  Review of Systems   Constitutional:  Negative for activity change, appetite change and unexpected weight change.   Cardiovascular:  Negative for chest pain and palpitations.   Gastrointestinal:  Negative for abdominal pain and nausea.   Psychiatric/Behavioral:  Negative for  "agitation and sleep disturbance.      Vital Signs:   /82   Pulse 105   Ht 165.1 cm (65\")   Wt 69.1 kg (152 lb 6.4 oz)   BMI 25.36 kg/m²      Lab Results:   Telephone on 02/06/2025   Component Date Value Ref Range Status    THC, Screen, Urine 03/05/2025 Negative  Negative Final    Phencyclidine (PCP), Urine 03/05/2025 Negative  Negative Final    Cocaine Screen, Urine 03/05/2025 Negative  Negative Final    Methamphetamine, Ur 03/05/2025 Negative  Negative Final    Opiate Screen 03/05/2025 Negative  Negative Final    Amphetamine Screen, Urine 03/05/2025 Negative  Negative Final    Benzodiazepine Screen, Urine 03/05/2025 Negative  Negative Final    Tricyclic Antidepressants Screen 03/05/2025 Negative  Negative Final    Methadone Screen, Urine 03/05/2025 Negative  Negative Final    Barbiturates Screen, Urine 03/05/2025 Negative  Negative Final    Oxycodone Screen, Urine 03/05/2025 Negative  Negative Final    Buprenorphine, Screen, Urine 03/05/2025 Negative  Negative Final    Fentanyl, Urine 03/05/2025 Negative  Negative Final   Lab on 01/23/2025   Component Date Value Ref Range Status    Glucose 01/23/2025 101 (H)  65 - 99 mg/dL Final    BUN 01/23/2025 9  6 - 20 mg/dL Final    Creatinine 01/23/2025 0.78  0.57 - 1.00 mg/dL Final    Sodium 01/23/2025 136  136 - 145 mmol/L Final    Potassium 01/23/2025 3.6  3.5 - 5.2 mmol/L Final    Chloride 01/23/2025 98  98 - 107 mmol/L Final    CO2 01/23/2025 24.3  22.0 - 29.0 mmol/L Final    Calcium 01/23/2025 9.6  8.6 - 10.5 mg/dL Final    Total Protein 01/23/2025 7.1  6.0 - 8.5 g/dL Final    Albumin 01/23/2025 4.4  3.5 - 5.2 g/dL Final    ALT (SGPT) 01/23/2025 13  1 - 33 U/L Final    AST (SGOT) 01/23/2025 17  1 - 32 U/L Final    Alkaline Phosphatase 01/23/2025 59  39 - 117 U/L Final    Total Bilirubin 01/23/2025 0.3  0.0 - 1.2 mg/dL Final    Globulin 01/23/2025 2.7  gm/dL Final    A/G Ratio 01/23/2025 1.6  g/dL Final    BUN/Creatinine Ratio 01/23/2025 11.5  7.0 - 25.0 Final "    Anion Gap 01/23/2025 13.7  5.0 - 15.0 mmol/L Final    eGFR 01/23/2025 95.0  >60.0 mL/min/1.73 Final   Lab on 11/01/2024   Component Date Value Ref Range Status    Glucose 11/01/2024 89  65 - 99 mg/dL Final    BUN 11/01/2024 9  6 - 20 mg/dL Final    Creatinine 11/01/2024 0.78  0.57 - 1.00 mg/dL Final    Sodium 11/01/2024 137  136 - 145 mmol/L Final    Potassium 11/01/2024 3.8  3.5 - 5.2 mmol/L Final    Chloride 11/01/2024 100  98 - 107 mmol/L Final    CO2 11/01/2024 25.3  22.0 - 29.0 mmol/L Final    Calcium 11/01/2024 9.3  8.6 - 10.5 mg/dL Final    Total Protein 11/01/2024 7.4  6.0 - 8.5 g/dL Final    Albumin 11/01/2024 4.5  3.5 - 5.2 g/dL Final    ALT (SGPT) 11/01/2024 46 (H)  1 - 33 U/L Final    AST (SGOT) 11/01/2024 54 (H)  1 - 32 U/L Final    Alkaline Phosphatase 11/01/2024 69  39 - 117 U/L Final    Total Bilirubin 11/01/2024 0.3  0.0 - 1.2 mg/dL Final    Globulin 11/01/2024 2.9  gm/dL Final    A/G Ratio 11/01/2024 1.6  g/dL Final    BUN/Creatinine Ratio 11/01/2024 11.5  7.0 - 25.0 Final    Anion Gap 11/01/2024 11.7  5.0 - 15.0 mmol/L Final    eGFR 11/01/2024 95.0  >60.0 mL/min/1.73 Final    Total Cholesterol 11/01/2024 240 (H)  0 - 200 mg/dL Final    Triglycerides 11/01/2024 71  0 - 150 mg/dL Final    HDL Cholesterol 11/01/2024 100 (H)  40 - 60 mg/dL Final    LDL Cholesterol  11/01/2024 128 (H)  0 - 100 mg/dL Final    VLDL Cholesterol 11/01/2024 12  5 - 40 mg/dL Final    LDL/HDL Ratio 11/01/2024 1.26   Final    TSH 11/01/2024 2.140  0.270 - 4.200 uIU/mL Final    Hepatitis C Ab 11/01/2024 Non-Reactive  Non-Reactive Final    WBC 11/01/2024 5.44  3.40 - 10.80 10*3/mm3 Final    RBC 11/01/2024 5.09  3.77 - 5.28 10*6/mm3 Final    Hemoglobin 11/01/2024 12.3  12.0 - 15.9 g/dL Final    Hematocrit 11/01/2024 39.7  34.0 - 46.6 % Final    MCV 11/01/2024 78.0 (L)  79.0 - 97.0 fL Final    MCH 11/01/2024 24.2 (L)  26.6 - 33.0 pg Final    MCHC 11/01/2024 31.0 (L)  31.5 - 35.7 g/dL Final    RDW 11/01/2024 14.5  12.3 - 15.4 %  Final    RDW-SD 11/01/2024 41.9  37.0 - 54.0 fl Final    MPV 11/01/2024 11.9  6.0 - 12.0 fL Final    Platelets 11/01/2024 315  140 - 450 10*3/mm3 Final    Neutrophil % 11/01/2024 58.5  42.7 - 76.0 % Final    Lymphocyte % 11/01/2024 29.4  19.6 - 45.3 % Final    Monocyte % 11/01/2024 9.0  5.0 - 12.0 % Final    Eosinophil % 11/01/2024 2.4  0.3 - 6.2 % Final    Basophil % 11/01/2024 0.7  0.0 - 1.5 % Final    Immature Grans % 11/01/2024 0.0  0.0 - 0.5 % Final    Neutrophils, Absolute 11/01/2024 3.18  1.70 - 7.00 10*3/mm3 Final    Lymphocytes, Absolute 11/01/2024 1.60  0.70 - 3.10 10*3/mm3 Final    Monocytes, Absolute 11/01/2024 0.49  0.10 - 0.90 10*3/mm3 Final    Eosinophils, Absolute 11/01/2024 0.13  0.00 - 0.40 10*3/mm3 Final    Basophils, Absolute 11/01/2024 0.04  0.00 - 0.20 10*3/mm3 Final    Immature Grans, Absolute 11/01/2024 0.00  0.00 - 0.05 10*3/mm3 Final   Office Visit on 09/18/2024   Component Date Value Ref Range Status    Diagnosis 09/18/2024 Comment   Final    NEGATIVE FOR INTRAEPITHELIAL LESION OR MALIGNANCY.  CELLULAR CHANGES ASSOCIATED WITH INFLAMMATION ARE PRESENT.  THIS SPECIMEN WAS RESCREENED AS PART OF OUR  PROGRAM.    Specimen adequacy: 09/18/2024 Comment   Final    Satisfactory for evaluation.  Endocervical and/or squamous metaplastic  cells (endocervical component) are present.    Clinician Provided ICD-10: 09/18/2024 Comment   Final    Z01.419    Performed by: 09/18/2024 Comment   Final    Sreedhar Arcos, Cytotechnologist ([facility])    QC reviewed by: 09/18/2024 Comment   Final    Pilar Faria, Supervisory Cytotechnologist ([facility])    . 09/18/2024 .   Final    Note: 09/18/2024 Comment   Final    The Pap smear is a screening test designed to aid in the detection of  premalignant and malignant conditions of the uterine cervix.  It is not a  diagnostic procedure and should not be used as the sole means of detecting  cervical cancer.  Both false-positive and false-negative reports do occur.     Method: 09/18/2024 Comment   Final    This liquid based ThinPrep(R) pap test was screened with the  use of an image guided system.    Conv .conv 09/18/2024 Comment   Final    The HPV DNA reflex criteria were not met with this specimen result  therefore, no HPV testing was performed.     EKG Results:  No orders to display     Imaging Results:  No Images in the past 120 days found.    ASSESSMENT AND PLAN:    ICD-10-CM ICD-9-CM   1. Attention deficit hyperactivity disorder (ADHD), unspecified ADHD type  F90.9 314.01   2. Situational anxiety  F41.8 300.09     45 y.o. female who presents today for follow-up. We have discussed the interval history and the treatment plan below:      Medication regimen: begin amphetamine IR 5 mg QPM - continue amphetamine XR  Monitoring: reviewed labs as populated above; ordered  Primary psychotherapy: deferred  Follow-up: 2 months  Communications: N/A  Treatment plan: due    TREATMENT PLAN/GOALS: challenge patterns of living conducive to symptom burden, implement recommended regimen as above with augmentative, intermittent supportive psychotherapy to reduce symptom burden. Patient acknowledged and verbally consented to continue treatment.      Billing: This encounter is of moderate complexity based on number/complexity of problems addressed today and risk of complications/morbidity: 2+ stable chronic illnesses and and prescription management. Additionally, I provided 17 minutes of dedicated psychotherapy to the patient, distinct from E/M services, as documented above. Start time: 1439. Stop time: 1456.     Electronically signed by Alonzo Crain MD, 03/12/25, 3006

## 2025-03-12 NOTE — TREATMENT PLAN
"Multi-Disciplinary Problems (from Behavioral Health Treatment Plan)      Active Problems       Problem:  Patient Care Overview (Adult)  Start Date: 03/12/25      Problem Details: Treatment Planning for Montserrat \"Hue\"    Applicable diagnoses/problems:    - ADHD: practice behaviors that are conducive to creating functional routines (set a medication schedule, a sleep schedule, an activity schedule, limits on spending and other potential items of impulsivity); work towards optimizing a balance of utilizing compensatory mechanisms (including medications), accepting aid from applicable social communities/family, and validation of variable attention-stimulus traits in order to optimize daily functioning.  - progress: moderate, progressing  - frequency of intervention: 4-8 weeks as scheduling allows  - duration of treatment: until optimized functional status is met    - Anxiety: enhance engagement with regard to ego-syntonic items of living via routine building and disruption of inhibitory executive cognitive circuits; challenge avoidance of ego-syntonic items of living via disruption to perceived barriers; reduce salience of fears and overwhelm with regard to their effects on thinking and behavior.  - progress: good, maintaining  - frequency of intervention: 4-8 weeks as scheduling allows  - duration of treatment: until optimized functional status is met        Goal Priority Start Date Expected End Date End Date    Plan of Care Review -- 03/12/25 09/10/25 --                             "

## 2025-05-05 ENCOUNTER — OFFICE VISIT (OUTPATIENT)
Dept: PSYCHIATRY | Facility: CLINIC | Age: 45
End: 2025-05-05
Payer: COMMERCIAL

## 2025-05-05 VITALS
HEART RATE: 81 BPM | DIASTOLIC BLOOD PRESSURE: 86 MMHG | HEIGHT: 65 IN | SYSTOLIC BLOOD PRESSURE: 144 MMHG | BODY MASS INDEX: 25.33 KG/M2 | WEIGHT: 152 LBS

## 2025-05-05 DIAGNOSIS — F41.8 SITUATIONAL ANXIETY: ICD-10-CM

## 2025-05-05 DIAGNOSIS — F90.9 ATTENTION DEFICIT HYPERACTIVITY DISORDER (ADHD), UNSPECIFIED ADHD TYPE: Primary | ICD-10-CM

## 2025-05-05 RX ORDER — DEXTROAMPHETAMINE SACCHARATE, AMPHETAMINE ASPARTATE MONOHYDRATE, DEXTROAMPHETAMINE SULFATE AND AMPHETAMINE SULFATE 1.25; 1.25; 1.25; 1.25 MG/1; MG/1; MG/1; MG/1
5 CAPSULE, EXTENDED RELEASE ORAL EVERY MORNING
Qty: 30 CAPSULE | Refills: 0 | Status: SHIPPED | OUTPATIENT
Start: 2025-05-09

## 2025-05-05 RX ORDER — DEXTROAMPHETAMINE SACCHARATE, AMPHETAMINE ASPARTATE, DEXTROAMPHETAMINE SULFATE AND AMPHETAMINE SULFATE 1.25; 1.25; 1.25; 1.25 MG/1; MG/1; MG/1; MG/1
5 TABLET ORAL
Qty: 30 TABLET | Refills: 0 | Status: SHIPPED | OUTPATIENT
Start: 2025-06-06

## 2025-05-05 RX ORDER — DEXTROAMPHETAMINE SACCHARATE, AMPHETAMINE ASPARTATE, DEXTROAMPHETAMINE SULFATE AND AMPHETAMINE SULFATE 1.25; 1.25; 1.25; 1.25 MG/1; MG/1; MG/1; MG/1
5 TABLET ORAL
Qty: 30 TABLET | Refills: 0 | Status: SHIPPED | OUTPATIENT
Start: 2025-05-09

## 2025-05-05 RX ORDER — DEXTROAMPHETAMINE SACCHARATE, AMPHETAMINE ASPARTATE MONOHYDRATE, DEXTROAMPHETAMINE SULFATE AND AMPHETAMINE SULFATE 1.25; 1.25; 1.25; 1.25 MG/1; MG/1; MG/1; MG/1
5 CAPSULE, EXTENDED RELEASE ORAL EVERY MORNING
Qty: 30 CAPSULE | Refills: 0 | Status: SHIPPED | OUTPATIENT
Start: 2025-06-06

## 2025-05-05 NOTE — PROGRESS NOTES
"[facility]  Follow-up Visit    Chief Complaint: \"My blood pressure in certain situations started to spike... I went to my PCP thinking I needed a BP medication... but she said it's very much the opposite... situational... my blood pressure isn't usually high.\"     History of Present Illness: Montserrat De La Rosa is a 45 y.o. female who presents today for follow-up. Last seen by this practice: 03/12 at which time amphetamine IR was started.    Current treatment regimen includes:   - amphetamine XR 5 mg QAM + IR 5 mg QPM    Hue presents on time and unaccompanied in no acute distress and engages with me appropriately. Today she reports she's seen considerable benefit with the Yazidi of recent changes. ADHD and anxious symptoms are manageable with current interventions. She feels her regimen conveys sufficient benefit.  - sleep: no changes  - appetite: no changes; weight is stable  - medication adverse effects: denies    Interval History and Clinical Commentary:   Ego-syntonic. Hue presents in a fashion consistent with the spectrum of prior assessments with regard to MSE.    Interval history reported to be generally unremarkable aside from a [place] - planned for late September.    Axis I: ADHD, anxiety (situational)  Axis II: defer  Axis III: defer  Axis IV: defer  Axis V: 80    Differential considerations: N/A    Adherence:  Treatment adherence is appropriate; issues in this regard have included: N/A. Patient is advised not to misuse prescribed medications or to use them with any exogenous substances that aren't disclosed to this provider as they may interact with the regimen to the patient's detriment. The importance of adherence to the recommended treatment and interval follow-up appointments has been emphasized.     Education:  I have counseled Hue with regard to diagnoses and the recommended treatment regimen as documented below. " I have advised that because medications can elicit idiosyncratic reactions in different individuals that SE may present in ways that haven't been discussed. I have reiterated the importance of discussing with me any clinical changes that could represent potential adverse effects regarding the medication regimen.    Patient acknowledges the diagnoses per my rendered interpretation. Patient is agreeable to call 911 or go to the nearest ER should she become concerned for her own safety and/or the safety of those around her. Patient demonstrates understanding of potential risks/benefits/side effects associated with the recommended treatment regimen and is amenable to proceed in the fashion outlined below. Patient has been encouraged to cultivate constructive patterns of living including limiting daily caffeine intake, hydrating appropriately, regularly eating nutritious foods, engaging sleep hygiene practices, engaging in appropriate exposure to sunlight, engaging with hobbies in balance with life necessities, and exercising appropriate to their capacity to do so.    Risk:  There is no significant change to risk profile discernible during today's evaluation - do note that this is subject to change with the Caodaism of new stressors, treatment non-adherence, use of substances, and/or new medical ails. There is no appreciable evidence of intent for harm to self or others. There are no appreciable indices of greg/psychosis.    Contraception and mitigation of potential teratogenicity: not currently using contraception. I have advised there are risks taking any medication during pregnancy and, unfortunately, these risks are poorly elaborated due to ethical concerns with studying medications in pregnant women. I have advised that in the case of pregnancy risk may be beyond what I'm able to advise and the general approach is that medication use should only be considered if potential benefits outweigh the possibility of risks by  the patient's measure.    Psychotherapy:  - Time: 9 minutes  - interventions employed: the therapeutic alliance was strengthened to encourage the patient to express their thoughts and feelings freely. Esteem building was enhanced through praise, reassurance, normalizing/challenging, and encouragement as appropriate. Coping skills were enhanced to build distress tolerance skills and emotional regulation. Allowed patient to freely discuss issues without interruption or judgement with unconditional positive regard, active listening skills, and empathy. Provided a safe, confidential environment to facilitate the development of a positive therapeutic relationship and encourage open, honest communication. Assisted patient in processing session content; acknowledged and normalized/addressed, as appropriate, patient’s thoughts, feelings, and concerns by utilizing a person-centered approach in efforts to build appropriate rapport and a positive therapeutic relationship.   - Diagnoses: see assessment and plan below  - Symptoms: see subjective above  - Goals   - patient: cultivate focus/concentration, mitigate anxiety and its impact on thinking and emotions, bolster functional capacity   - provider: challenge patterns of living conducive to pathology, strengthen defenses, promote problems solving, restore adaptive functioning and provide symptom relief.  - Treatment plan: continue supportive psychotherapy in subsequent appointments to provide symptom relief; see assessment and plan below for additional details:   - iteration: 1   - progress: partial   - (X)illumination, (X)contextualization, (working)detection, (working)development, (-)elaboration, (-)refinement  - functional status: good  - mental status exam: as below  - prognosis: good  - assignment(s): N/A    Psychiatric History:  Diagnoses: ADHD (diagnosed in elementary school), social anxiety  Outpatient history: denies  Inpatient history: denies  Medication trials:  "sertraline (affective dulling, weight gain), clonidine (overly sedating)  Other treatment modalities: denies therapy hx  Presenting regimen: N/A  Self harm: denies  Suicide attempts: denies     Substance Abuse History:   Types/methods/frequency: THC rarely      Social History:  Residence: lives in a house with her  ( 10 years, together 20 years) and their rescued animals  Vocation: MRI technician  Education: radiology certification after HS, one class away from associate's degree; [employer]  Pertinent developmental history: denies abuse hx  Pertinent legal history: denies  Hobbies/interests: traveling, reading, yoga, cooking  Episcopal: \"spiritual... I was raised Presbyterian\"   Exercise: works out regularly  Dietary habits: defer  Sleep hygiene: defer  Social habits: no pertinent issues  Sunlight: no concern for under-exposure  Caffeine intake: no pertinent issues; 1 glass of sweet tea daily  Hydration habits: no pertinent issues   history: denies    Social History     Socioeconomic History    Marital status:    Tobacco Use    Smoking status: Former     Current packs/day: 0.00     Average packs/day: 1.1 packs/day for 17.3 years (19.5 ttl pk-yrs)     Types: Cigarettes     Start date: 1995     Quit date: 2008     Years since quittin.3    Smokeless tobacco: Never   Vaping Use    Vaping status: Never Used   Substance and Sexual Activity    Alcohol use: Not Currently    Drug use: Not Currently    Sexual activity: Yes     Partners: Male     Birth control/protection: None     Tobacco use counseling/intervention: N/A, patient does not use tobacco; patient has been counseled with regard to risks of tobacco use.    PHQ-9 Depression Screening  PHQ-9 Total Score:       Little interest or pleasure in doing things?     Feeling down, depressed, or hopeless?     PHQ-2 Total Score     Trouble falling or staying asleep, or sleeping too much?     Feeling tired or having little " energy?     Poor appetite or overeating?     Feeling bad about yourself - or that you are a failure or have let yourself or your family down?     Trouble concentrating on things, such as reading the newspaper or watching television?     Moving or speaking so slowly that other people could have noticed? Or the opposite - being so fidgety or restless that you have been moving around a lot more than usual?     Thoughts that you would be better off dead, or of hurting yourself in some way?     PHQ-9 Total Score     If you checked off any problems, how difficult have these problems made it for you to do your work, take care of things at home, or get along with other people?       Change in PHQ-9 since last measure: N/A (4)    GAIL-7       Change in GAIL-7 since last measure: N/A (13)    Problem List:  Patient Active Problem List   Diagnosis    Allergic rhinitis    Elevated blood pressure reading    Colon cancer screening    Attention deficit hyperactivity disorder (ADHD)    Situational anxiety     Allergy:   Allergies   Allergen Reactions    Sulfacetamide Sodium Anaphylaxis    Sulfa Antibiotics Unknown - High Severity     acute kidney failure    Wellbutrin [Bupropion] Other (See Comments)     Seizure like Sx (muscle twitching all over)       Discontinued Medications:  Medications Discontinued During This Encounter   Medication Reason    amphetamine-dextroamphetamine XR (Adderall XR) 5 MG 24 hr capsule     amphetamine-dextroamphetamine (ADDERALL) 5 MG tablet        Current Medications:   Current Outpatient Medications   Medication Sig Dispense Refill    amphetamine-dextroamphetamine (ADDERALL) 5 MG tablet Take 1 tablet by mouth Every Afternoon. 30 tablet 0    amphetamine-dextroamphetamine XR (Adderall XR) 5 MG 24 hr capsule Take 1 capsule by mouth Every Morning 30 capsule 0    levocetirizine (XYZAL) 5 MG tablet Take 1 tablet by mouth Daily. 90 tablet 3    loratadine-pseudoephedrine (Claritin-D 12 Hour) 5-120 MG per 12 hr  tablet Take 1 tablet by mouth Daily. 90 tablet 3     No current facility-administered medications for this visit.     Past Medical History:  Past Medical History:   Diagnosis Date    ADHD (attention deficit hyperactivity disorder)     Allergic     Anxiety     Fibroid     Hypertension     White coat syndrome    Obsessive-compulsive disorder     PMS (premenstrual syndrome)      Past Surgical History:  Past Surgical History:   Procedure Laterality Date    ADENOIDECTOMY  1985    BILATERAL INSERTION OF EAR TUBES AND ADENOIDECTOMY      COLONOSCOPY N/A 08/28/2023    Procedure: COLONOSCOPY FOR SCREENING;  Surgeon: Kiana Foster MD;  Location: [facility];  Service: Gastroenterology;  Laterality: N/A;  hemorrhoids    FACIAL RECONSTRUCTION SURGERY Right     FRACTURE SURGERY  1997    RHINOPLASTY      SEPTOPLASTY  1997     Mental Status Exam:   Appearance: well-groomed, sits upright, age-appropriate, average habitus  Behavior: amicable, energetic, cooperative, appropriate in demeanor, appropriate eye-contact  Mood/affect: euthymic / mood-congruent and appropriate in both range and amplitude  Speech: loquacious, otherwise within expected variance; appropriate rate, appropriate rhythm, appropriate tone; non-pressured  Thought Process: linear, goal-directed; no FOI or FEDERICO; abstraction intact  Thought Content: coherent, devoid of overt delusions/perceptual disturbances  SI/HI: denies both SI and HI; exhibits future-orientation, self-advocates appropriately, no regular self-harm, no appreciable intent  Memory: no overt deficits  Orientation: oriented to person/place/time/situation  Concentration: appropriate  Intellectual capacity: presumptively average  Insight: good by given history/exam  Judgment: appropriate by given history/exam  Psychomotor: no appreciable latency/retardation/agitation/tremor  Gait: WNL    Review of Systems:  Review of Systems   Constitutional:  Negative for activity change, appetite change and  "unexpected weight change.   HENT:  Negative for drooling.    Eyes:  Negative for visual disturbance.   Respiratory:  Negative for chest tightness and shortness of breath.    Cardiovascular:  Negative for chest pain and palpitations.   Gastrointestinal:  Negative for abdominal pain, diarrhea and nausea.   Endocrine: Negative for cold intolerance and heat intolerance.   Genitourinary:  Negative for difficulty urinating and frequency.   Musculoskeletal:  Negative for myalgias and neck stiffness.   Skin:  Negative for rash.   Neurological:  Negative for dizziness, tremors, seizures and light-headedness.     Vital Signs:   /86   Pulse 81   Ht 165.1 cm (65\")   Wt 68.9 kg (152 lb)   BMI 25.29 kg/m²      Lab Results:   Telephone on 02/06/2025   Component Date Value Ref Range Status    THC, Screen, Urine 03/05/2025 Negative  Negative Final    Phencyclidine (PCP), Urine 03/05/2025 Negative  Negative Final    Cocaine Screen, Urine 03/05/2025 Negative  Negative Final    Methamphetamine, Ur 03/05/2025 Negative  Negative Final    Opiate Screen 03/05/2025 Negative  Negative Final    Amphetamine Screen, Urine 03/05/2025 Negative  Negative Final    Benzodiazepine Screen, Urine 03/05/2025 Negative  Negative Final    Tricyclic Antidepressants Screen 03/05/2025 Negative  Negative Final    Methadone Screen, Urine 03/05/2025 Negative  Negative Final    Barbiturates Screen, Urine 03/05/2025 Negative  Negative Final    Oxycodone Screen, Urine 03/05/2025 Negative  Negative Final    Buprenorphine, Screen, Urine 03/05/2025 Negative  Negative Final    Fentanyl, Urine 03/05/2025 Negative  Negative Final   Lab on 01/23/2025   Component Date Value Ref Range Status    Glucose 01/23/2025 101 (H)  65 - 99 mg/dL Final    BUN 01/23/2025 9  6 - 20 mg/dL Final    Creatinine 01/23/2025 0.78  0.57 - 1.00 mg/dL Final    Sodium 01/23/2025 136  136 - 145 mmol/L Final    Potassium 01/23/2025 3.6  3.5 - 5.2 mmol/L Final    Chloride 01/23/2025 98  98 " - 107 mmol/L Final    CO2 01/23/2025 24.3  22.0 - 29.0 mmol/L Final    Calcium 01/23/2025 9.6  8.6 - 10.5 mg/dL Final    Total Protein 01/23/2025 7.1  6.0 - 8.5 g/dL Final    Albumin 01/23/2025 4.4  3.5 - 5.2 g/dL Final    ALT (SGPT) 01/23/2025 13  1 - 33 U/L Final    AST (SGOT) 01/23/2025 17  1 - 32 U/L Final    Alkaline Phosphatase 01/23/2025 59  39 - 117 U/L Final    Total Bilirubin 01/23/2025 0.3  0.0 - 1.2 mg/dL Final    Globulin 01/23/2025 2.7  gm/dL Final    A/G Ratio 01/23/2025 1.6  g/dL Final    BUN/Creatinine Ratio 01/23/2025 11.5  7.0 - 25.0 Final    Anion Gap 01/23/2025 13.7  5.0 - 15.0 mmol/L Final    eGFR 01/23/2025 95.0  >60.0 mL/min/1.73 Final     EKG Results:  No orders to display     Imaging Results:  No Images in the past 120 days found.    ASSESSMENT AND PLAN:    ICD-10-CM ICD-9-CM   1. Attention deficit hyperactivity disorder (ADHD), unspecified ADHD type  F90.9 314.01   2. Situational anxiety  F41.8 300.09     45 y.o. female who presents today for follow-up. We have discussed the interval history and the treatment plan below:      Medication regimen: no change - continue amphetamines XR + IR  Monitoring: reviewed labs as populated above; ordered  Primary psychotherapy: deferred  Follow-up: 3 months  Communications: N/A  Treatment plan: 03/12/2025    TREATMENT PLAN/GOALS: challenge patterns of living conducive to symptom burden, implement recommended regimen as above with augmentative, intermittent supportive psychotherapy to reduce symptom burden. Patient acknowledged and verbally consented to continue treatment.      Billing: This encounter is of moderate complexity based on number/complexity of problems addressed today and risk of complications/morbidity: 2+ stable chronic illnesses and and prescription management.     Electronically signed by Alonzo Crain MD, 05/05/25, 1971

## 2025-07-11 DIAGNOSIS — F90.9 ATTENTION DEFICIT HYPERACTIVITY DISORDER (ADHD), UNSPECIFIED ADHD TYPE: ICD-10-CM

## 2025-07-11 RX ORDER — DEXTROAMPHETAMINE SACCHARATE, AMPHETAMINE ASPARTATE MONOHYDRATE, DEXTROAMPHETAMINE SULFATE AND AMPHETAMINE SULFATE 1.25; 1.25; 1.25; 1.25 MG/1; MG/1; MG/1; MG/1
5 CAPSULE, EXTENDED RELEASE ORAL EVERY MORNING
Qty: 30 CAPSULE | Refills: 0 | Status: SHIPPED | OUTPATIENT
Start: 2025-07-11

## 2025-07-11 RX ORDER — DEXTROAMPHETAMINE SACCHARATE, AMPHETAMINE ASPARTATE, DEXTROAMPHETAMINE SULFATE AND AMPHETAMINE SULFATE 1.25; 1.25; 1.25; 1.25 MG/1; MG/1; MG/1; MG/1
5 TABLET ORAL
Qty: 30 TABLET | Refills: 0 | Status: SHIPPED | OUTPATIENT
Start: 2025-07-11

## 2025-07-11 NOTE — TELEPHONE ENCOUNTER
MEDICATION: amphetamine-dextroamphetamine XR (Adderall XR) 5 MG 24 hr capsule (06/06/2025)     amphetamine-dextroamphetamine (ADDERALL) 5 MG tablet (06/06/2025)          NEXT OFFICE VISIT: Appointment with Alonzo Crain MD (08/04/2025)     LAST OFFICE VISIT: Office Visit with Alonzo Crain MD (05/05/2025)     NARC CONSENT: CAN'T LOCATE IN [facility]    URINE DRUG SCREEN(STANDING ORDER)   (HEMA MARTINEZ & TAMMI 1 PER YEAR): Urine Drug Screen - Urine, Clean Catch (03/05/2025 07:32)     PROVIDER PLEASE ADVISE

## 2025-07-14 DIAGNOSIS — J30.9 ALLERGIC RHINITIS, UNSPECIFIED SEASONALITY, UNSPECIFIED TRIGGER: ICD-10-CM

## 2025-07-14 RX ORDER — LEVOCETIRIZINE DIHYDROCHLORIDE 5 MG/1
5 TABLET, FILM COATED ORAL DAILY
Qty: 90 TABLET | Refills: 1 | Status: SHIPPED | OUTPATIENT
Start: 2025-07-14

## 2025-07-14 RX ORDER — LORATADINE AND PSEUDOEPHEDRINE SULFATE 5; 120 MG/1; MG/1
1 TABLET, EXTENDED RELEASE ORAL
Qty: 90 TABLET | Refills: 1 | Status: SHIPPED | OUTPATIENT
Start: 2025-07-14

## 2025-08-04 ENCOUNTER — OFFICE VISIT (OUTPATIENT)
Dept: PSYCHIATRY | Facility: CLINIC | Age: 45
End: 2025-08-04
Payer: COMMERCIAL

## 2025-08-04 VITALS
BODY MASS INDEX: 24.76 KG/M2 | HEART RATE: 82 BPM | SYSTOLIC BLOOD PRESSURE: 133 MMHG | DIASTOLIC BLOOD PRESSURE: 81 MMHG | WEIGHT: 148.6 LBS | HEIGHT: 65 IN

## 2025-08-04 DIAGNOSIS — F41.8 SITUATIONAL ANXIETY: ICD-10-CM

## 2025-08-04 DIAGNOSIS — F90.9 ATTENTION DEFICIT HYPERACTIVITY DISORDER (ADHD), UNSPECIFIED ADHD TYPE: Primary | ICD-10-CM

## 2025-08-04 PROCEDURE — 99214 OFFICE O/P EST MOD 30 MIN: CPT | Performed by: PSYCHIATRY & NEUROLOGY

## 2025-08-04 RX ORDER — DEXTROAMPHETAMINE SACCHARATE, AMPHETAMINE ASPARTATE, DEXTROAMPHETAMINE SULFATE AND AMPHETAMINE SULFATE 1.25; 1.25; 1.25; 1.25 MG/1; MG/1; MG/1; MG/1
5 TABLET ORAL
Qty: 30 TABLET | Refills: 0 | Status: SHIPPED | OUTPATIENT
Start: 2025-08-08

## 2025-08-04 RX ORDER — DEXTROAMPHETAMINE SACCHARATE, AMPHETAMINE ASPARTATE MONOHYDRATE, DEXTROAMPHETAMINE SULFATE AND AMPHETAMINE SULFATE 2.5; 2.5; 2.5; 2.5 MG/1; MG/1; MG/1; MG/1
10 CAPSULE, EXTENDED RELEASE ORAL EVERY MORNING
Qty: 30 CAPSULE | Refills: 0 | Status: SHIPPED | OUTPATIENT
Start: 2025-08-08

## (undated) DEVICE — SOLIDIFIER LIQLOC PLS 1500CC BT

## (undated) DEVICE — LINER SURG CANSTR SXN S/RIGD 1500CC

## (undated) DEVICE — Device: Brand: DEFENDO AIR/WATER/SUCTION AND BIOPSY VALVE

## (undated) DEVICE — Device

## (undated) DEVICE — SOL IRRG H2O PL/BG 1000ML STRL

## (undated) DEVICE — CONN JET HYDRA H20 AUXILIARY DISP